# Patient Record
Sex: MALE | Race: WHITE | Employment: STUDENT | RURAL
[De-identification: names, ages, dates, MRNs, and addresses within clinical notes are randomized per-mention and may not be internally consistent; named-entity substitution may affect disease eponyms.]

---
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
## 2021-04-03 ENCOUNTER — HOSPITAL ENCOUNTER (EMERGENCY)
Age: 21
Discharge: HOME | End: 2021-04-03
Attending: EMERGENCY MEDICINE
Payer: MEDICAID

## 2021-04-03 VITALS
BODY MASS INDEX: 20.08 KG/M2 | HEIGHT: 68 IN | HEART RATE: 73 BPM | SYSTOLIC BLOOD PRESSURE: 132 MMHG | DIASTOLIC BLOOD PRESSURE: 60 MMHG | OXYGEN SATURATION: 100 % | RESPIRATION RATE: 20 BRPM | WEIGHT: 132.5 LBS | TEMPERATURE: 98 F

## 2021-04-03 DIAGNOSIS — L02.01 CUTANEOUS ABSCESS OF FACE: Primary | ICD-10-CM

## 2021-04-03 PROCEDURE — 99284 EMERGENCY DEPT VISIT MOD MDM: CPT

## 2021-04-03 PROCEDURE — 74011000250 HC RX REV CODE- 250: Performed by: EMERGENCY MEDICINE

## 2021-04-03 PROCEDURE — 75810000289 HC I&D ABSCESS SIMP/COMP/MULT

## 2021-04-03 PROCEDURE — 74011250637 HC RX REV CODE- 250/637: Performed by: EMERGENCY MEDICINE

## 2021-04-03 RX ORDER — OXYCODONE AND ACETAMINOPHEN 5; 325 MG/1; MG/1
1 TABLET ORAL ONCE
Status: COMPLETED | OUTPATIENT
Start: 2021-04-03 | End: 2021-04-03

## 2021-04-03 RX ORDER — LIDOCAINE HYDROCHLORIDE AND EPINEPHRINE 10; 10 MG/ML; UG/ML
10 INJECTION, SOLUTION INFILTRATION; PERINEURAL ONCE
Status: COMPLETED | OUTPATIENT
Start: 2021-04-03 | End: 2021-04-03

## 2021-04-03 RX ORDER — IBUPROFEN 800 MG/1
800 TABLET ORAL ONCE
Status: COMPLETED | OUTPATIENT
Start: 2021-04-03 | End: 2021-04-03

## 2021-04-03 RX ORDER — DOXYCYCLINE HYCLATE 100 MG
100 TABLET ORAL
Status: COMPLETED | OUTPATIENT
Start: 2021-04-03 | End: 2021-04-03

## 2021-04-03 RX ORDER — DOXYCYCLINE HYCLATE 100 MG
100 TABLET ORAL 2 TIMES DAILY
Qty: 20 TAB | Refills: 0 | Status: SHIPPED | OUTPATIENT
Start: 2021-04-03 | End: 2021-04-13

## 2021-04-03 RX ADMIN — DOXYCYCLINE HYCLATE 100 MG: 100 TABLET, COATED ORAL at 20:19

## 2021-04-03 RX ADMIN — LIDOCAINE HYDROCHLORIDE,EPINEPHRINE BITARTRATE 100 MG: 10; .01 INJECTION, SOLUTION INFILTRATION; PERINEURAL at 19:30

## 2021-04-03 RX ADMIN — IBUPROFEN 800 MG: 800 TABLET, FILM COATED ORAL at 19:30

## 2021-04-03 RX ADMIN — OXYCODONE HYDROCHLORIDE AND ACETAMINOPHEN 1 TABLET: 5; 325 TABLET ORAL at 20:19

## 2021-04-03 NOTE — ED TRIAGE NOTES
Pt presents with left lower jaw abscess for the past month. Worse over the last 2 weeks. Approx \"golf ball\" in size. Denies drainage.

## 2021-04-04 NOTE — ED PROVIDER NOTES
The history is provided by the patient. Skin Problem   This is a new problem. Episode onset: 4 weeks ago but would intermittently improve, began to gradually worsen one week ago. The problem has been gradually worsening. Patient reports a subjective fever - was not measured. The fever has been present for less than 1 day. The rash is present on the face. The pain is moderate. The pain has been constant since onset. Pertinent negatives include no weeping. Treatments tried: took two doses of an unknown antibiotic. The treatment provided no relief.         Past Medical History:   Diagnosis Date    ADHD     Anxiety     Bipolar 1 disorder ([facility] [address]     Dental disorder     Depression     anxiety and depression    Depression     Depression     Headache     History of pseudoseizure        Past Surgical History:   Procedure Laterality Date    HX HEENT      HX ORTHOPAEDIC      pellett removal from foot    HX TONSILLECTOMY  2017         Family History:   Problem Relation Age of Onset    Hypertension Mother     Alcohol abuse Father        Social History     Socioeconomic History    Marital status: SINGLE     Spouse name: Not on file    Number of children: Not on file    Years of education: Not on file    Highest education level: Not on file   Occupational History    Not on file   Social Needs    Financial resource strain: Not on file    Food insecurity     Worry: Not on file     Inability: Not on file    Transportation needs     Medical: Not on file     Non-medical: Not on file   Tobacco Use    Smoking status: Never Smoker    Smokeless tobacco: Current User   Substance and Sexual Activity    Alcohol use: No    Drug use: No    Sexual activity: Not Currently   Lifestyle    Physical activity     Days per week: Not on file     Minutes per session: Not on file    Stress: Not on file   Relationships    Social connections     Talks on phone: Not on file     Gets together: Not on file     Attends Mosque service: Not on file     Active member of club or organization: Not on file     Attends meetings of clubs or organizations: Not on file     Relationship status: Not on file    Intimate partner violence     Fear of current or ex partner: Not on file     Emotionally abused: Not on file     Physically abused: Not on file     Forced sexual activity: Not on file   Other Topics Concern    Not on file   Social History Narrative    ** Merged History Encounter **              ALLERGIES: Bee sting [sting, bee] and Medrol [methylprednisolone]    Review of Systems   Constitutional: Positive for chills and fever (subjective). Respiratory: Negative for shortness of breath. Cardiovascular: Negative for chest pain. Gastrointestinal: Negative for abdominal pain, constipation, diarrhea and vomiting. Neurological: Negative for dizziness and light-headedness. All other systems reviewed and are negative. Vitals:    04/03/21 1841 04/03/21 1850   BP: 126/77    Pulse: 100    Resp: 16 15   Temp: 98.1 °F (36.7 °C)    SpO2: 96% 100%   Weight: 60.1 kg (132 lb 7.9 oz)    Height: 5' 8\" (1.727 m)             Physical Exam  Vitals signs and nursing note reviewed. Constitutional:       Appearance: He is well-developed. HENT:      Head: Normocephalic and atraumatic. Mouth/Throat:      Mouth: Mucous membranes are moist.      Dentition: Normal dentition. No dental tenderness, gingival swelling or dental abscesses. Eyes:      General: No scleral icterus. Neck:      Musculoskeletal: Normal range of motion. Cardiovascular:      Rate and Rhythm: Normal rate. Pulmonary:      Effort: Pulmonary effort is normal.   Abdominal:      General: There is no distension. Skin:     General: Skin is warm and dry. Findings: Abscess (4 cm to left lower jaw) present. No erythema or rash. Neurological:      Mental Status: He is alert and oriented to person, place, and time.           [facility]       I&D Abcess Simple    Date/Time: 4/3/2021 8:29 PM  Performed by: Marcus Telles MD  Authorized by: Marcus Telles MD     Consent:     Consent obtained:  Verbal    Consent given by:  Patient    Risks discussed:  Bleeding, incomplete drainage, pain and infection    Alternatives discussed:  No treatment and alternative treatment  Location:     Type:  Abscess    Size:  4 cm    Location:  Head    Head location:  Face  Pre-procedure details:     Skin preparation:  Chloraprep  Anesthesia (see MAR for exact dosages): Anesthesia method:  Local infiltration    Local anesthetic:  Lidocaine 1% WITH epi  Procedure type:     Complexity:  Simple  Procedure details:     Needle aspiration: no      Incision types:  Single straight    Incision depth:  Subcutaneous    Scalpel blade:  15    Wound management:  Probed and deloculated and irrigated with saline    Drainage:  Purulent    Drainage amount:  Copious    Wound treatment:  Wound left open    Packing materials:  None  Post-procedure details:     Patient tolerance of procedure: Tolerated well, no immediate complications              The patient is now resting comfortably and feels better, is alert, is not toxic, and is in no distress. The patient has a normal mental status and is neurologically intact. The rash does not have petechiae or purpura. There are no mucous membrane lesions, no signs of dental abscess, and no bullae. The patient appears well, has no fever, altered mental status or signs of systemic toxicity. The history, exam, diagnostic testing (if any) and current condition do not demonstrate signs of sepsis, meningitis, toxic shock syndrome or other significant systemic illness requiring further treatment, testing or consultation in the emergency department. The vital signs have been stable. The patient's condition is stable and appropriate for discharge.  The patient will pursue further outpatient evaluation with the primary care physician or other designated or consulting physician as indicated in the discharge instructions.

## 2021-04-04 NOTE — ED NOTES
Incision and drainage to left jaw abscess done by Dr. Randa Hollins Bulky dressing applied and patient is discharged with prescription and instructions for follow up. Patient discharged home with mother.  . No signs of distress at this time

## 2022-03-19 PROBLEM — Z87.898 HISTORY OF PSEUDOSEIZURE: Status: ACTIVE | Noted: 2017-02-02

## 2022-04-16 ENCOUNTER — HOSPITAL ENCOUNTER (EMERGENCY)
Dept: GENERAL RADIOLOGY | Age: 22
Discharge: HOME | End: 2022-04-16
Attending: STUDENT IN AN ORGANIZED HEALTH CARE EDUCATION/TRAINING PROGRAM
Payer: MEDICAID

## 2022-04-16 ENCOUNTER — HOSPITAL ENCOUNTER (EMERGENCY)
Age: 22
Discharge: HOME | End: 2022-04-16
Attending: STUDENT IN AN ORGANIZED HEALTH CARE EDUCATION/TRAINING PROGRAM
Payer: MEDICAID

## 2022-04-16 VITALS
TEMPERATURE: 98.2 F | HEIGHT: 68 IN | OXYGEN SATURATION: 96 % | WEIGHT: 155 LBS | RESPIRATION RATE: 16 BRPM | BODY MASS INDEX: 23.49 KG/M2 | HEART RATE: 58 BPM | SYSTOLIC BLOOD PRESSURE: 122 MMHG | DIASTOLIC BLOOD PRESSURE: 73 MMHG

## 2022-04-16 DIAGNOSIS — T07.XXXA ABRASIONS OF MULTIPLE SITES: Primary | ICD-10-CM

## 2022-04-16 DIAGNOSIS — R20.2 TINGLING OF RIGHT UPPER EXTREMITY: ICD-10-CM

## 2022-04-16 PROCEDURE — 74011250636 HC RX REV CODE- 250/636: Performed by: STUDENT IN AN ORGANIZED HEALTH CARE EDUCATION/TRAINING PROGRAM

## 2022-04-16 PROCEDURE — 90715 TDAP VACCINE 7 YRS/> IM: CPT | Performed by: STUDENT IN AN ORGANIZED HEALTH CARE EDUCATION/TRAINING PROGRAM

## 2022-04-16 PROCEDURE — 73030 X-RAY EXAM OF SHOULDER: CPT

## 2022-04-16 PROCEDURE — 74011000250 HC RX REV CODE- 250: Performed by: STUDENT IN AN ORGANIZED HEALTH CARE EDUCATION/TRAINING PROGRAM

## 2022-04-16 PROCEDURE — 99284 EMERGENCY DEPT VISIT MOD MDM: CPT

## 2022-04-16 PROCEDURE — 90471 IMMUNIZATION ADMIN: CPT

## 2022-04-16 PROCEDURE — 73080 X-RAY EXAM OF ELBOW: CPT

## 2022-04-16 RX ORDER — BACITRACIN 500 UNIT/G
1 PACKET (EA) TOPICAL 3 TIMES DAILY
Status: DISCONTINUED | OUTPATIENT
Start: 2022-04-16 | End: 2022-04-17 | Stop reason: HOSPADM

## 2022-04-16 RX ADMIN — TETANUS TOXOID, REDUCED DIPHTHERIA TOXOID AND ACELLULAR PERTUSSIS VACCINE, ADSORBED 0.5 ML: 5; 2.5; 8; 8; 2.5 SUSPENSION INTRAMUSCULAR at 21:50

## 2022-04-16 RX ADMIN — BACITRACIN 1 PACKET: 500 OINTMENT TOPICAL at 21:50
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
## 2022-04-17 NOTE — DISCHARGE INSTRUCTIONS
The tingling you are experiencing is likely related to stretching of the nerves in the brachial plexus from sudden arm movement, this will likely improve over the coming weeks. Please follow-up with your primary care, if you have any further concern regarding the issue of tingling or if you need new neurological complaints you can return to the ED or follow up with neurology for further testing such as a nerve conduction study.

## 2022-04-17 NOTE — ED NOTES
Discharge instructions given to pt by RN. Pt educated on wound care and f/u in teach back method and verbalizes understanding. Opportunity for questions provided. Pt ambulatory out of unit, in no acute distress and taken home by friend.
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
## 2022-04-17 NOTE — ED NOTES
Wound irrigated with sterile water and cleansed with wound cleanser. Pt tolerated well. Nonstick bandage applied to wound and wrapped with elastic gauze. Ice pack provided for RUE    Friend at the bedside. VSS. No s/s of acute distress noted.

## 2023-07-08 ENCOUNTER — HOSPITAL ENCOUNTER (EMERGENCY)
Facility: HOSPITAL | Age: 23
Discharge: PSYCHIATRIC OR SUBSTANCE USE FACILITY | End: 2023-07-09
Attending: EMERGENCY MEDICINE
Payer: MEDICAID

## 2023-07-08 DIAGNOSIS — F29 PSYCHOSIS, UNSPECIFIED PSYCHOSIS TYPE (HCC): ICD-10-CM

## 2023-07-08 DIAGNOSIS — F22 PARANOIA (HCC): Primary | ICD-10-CM

## 2023-07-08 DIAGNOSIS — E87.6 HYPOKALEMIA: ICD-10-CM

## 2023-07-08 LAB
ALBUMIN SERPL-MCNC: 4.4 G/DL (ref 3.5–5)
ALBUMIN/GLOB SERPL: 1.3 (ref 1.1–2.2)
ALP SERPL-CCNC: 52 U/L (ref 45–117)
ALT SERPL-CCNC: 21 U/L (ref 12–78)
AMPHET UR QL SCN: NEGATIVE
ANION GAP SERPL CALC-SCNC: 4 MMOL/L (ref 5–15)
APAP SERPL-MCNC: <2 UG/ML (ref 10–30)
APPEARANCE UR: CLEAR
AST SERPL-CCNC: 12 U/L (ref 15–37)
BACTERIA URNS QL MICRO: NEGATIVE /HPF
BARBITURATES UR QL SCN: NEGATIVE
BASOPHILS # BLD: 0 K/UL (ref 0–0.1)
BASOPHILS NFR BLD: 0 % (ref 0–1)
BENZODIAZ UR QL: NEGATIVE
BILIRUB SERPL-MCNC: 0.5 MG/DL (ref 0.2–1)
BILIRUB UR QL: NEGATIVE
BUN SERPL-MCNC: 11 MG/DL (ref 6–20)
BUN/CREAT SERPL: 11 (ref 12–20)
CALCIUM SERPL-MCNC: 9 MG/DL (ref 8.5–10.1)
CANNABINOIDS UR QL SCN: POSITIVE
CHLORIDE SERPL-SCNC: 106 MMOL/L (ref 97–108)
CO2 SERPL-SCNC: 28 MMOL/L (ref 21–32)
COCAINE UR QL SCN: NEGATIVE
COLOR UR: ABNORMAL
CREAT SERPL-MCNC: 1.03 MG/DL (ref 0.7–1.3)
DIFFERENTIAL METHOD BLD: ABNORMAL
EOSINOPHIL # BLD: 0.1 K/UL (ref 0–0.4)
EOSINOPHIL NFR BLD: 1 % (ref 0–7)
EPITH CASTS URNS QL MICRO: ABNORMAL /LPF
ERYTHROCYTE [DISTWIDTH] IN BLOOD BY AUTOMATED COUNT: 12.4 % (ref 11.5–14.5)
ETHANOL SERPL-MCNC: <10 MG/DL (ref 0–0.08)
GLOBULIN SER CALC-MCNC: 3.4 G/DL (ref 2–4)
GLUCOSE SERPL-MCNC: 108 MG/DL (ref 65–100)
GLUCOSE UR STRIP.AUTO-MCNC: NEGATIVE MG/DL
HCT VFR BLD AUTO: 40.4 % (ref 36.6–50.3)
HGB BLD-MCNC: 13.3 G/DL (ref 12.1–17)
HGB UR QL STRIP: NEGATIVE
HYALINE CASTS URNS QL MICRO: ABNORMAL /LPF (ref 0–5)
IMM GRANULOCYTES # BLD AUTO: 0 K/UL (ref 0–0.04)
IMM GRANULOCYTES NFR BLD AUTO: 0 % (ref 0–0.5)
KETONES UR QL STRIP.AUTO: ABNORMAL MG/DL
LEUKOCYTE ESTERASE UR QL STRIP.AUTO: NEGATIVE
LIPASE SERPL-CCNC: 62 U/L (ref 73–393)
LYMPHOCYTES # BLD: 1.8 K/UL (ref 0.8–3.5)
LYMPHOCYTES NFR BLD: 24 % (ref 12–49)
Lab: ABNORMAL
MAGNESIUM SERPL-MCNC: 2.2 MG/DL (ref 1.6–2.4)
MCH RBC QN AUTO: 29.6 PG (ref 26–34)
MCHC RBC AUTO-ENTMCNC: 32.9 G/DL (ref 30–36.5)
MCV RBC AUTO: 89.8 FL (ref 80–99)
METHADONE UR QL: NEGATIVE
MONOCYTES # BLD: 0.5 K/UL (ref 0–1)
MONOCYTES NFR BLD: 6 % (ref 5–13)
NEUTS SEG # BLD: 5.1 K/UL (ref 1.8–8)
NEUTS SEG NFR BLD: 69 % (ref 32–75)
NITRITE UR QL STRIP.AUTO: NEGATIVE
NRBC # BLD: 0 K/UL (ref 0–0.01)
NRBC BLD-RTO: 0 PER 100 WBC
OPIATES UR QL: NEGATIVE
PCP UR QL: NEGATIVE
PH UR STRIP: 6 (ref 5–8)
PLATELET # BLD AUTO: 114 K/UL (ref 150–400)
POTASSIUM SERPL-SCNC: 3 MMOL/L (ref 3.5–5.1)
PROT SERPL-MCNC: 7.8 G/DL (ref 6.4–8.2)
PROT UR STRIP-MCNC: NEGATIVE MG/DL
RBC # BLD AUTO: 4.5 M/UL (ref 4.1–5.7)
RBC #/AREA URNS HPF: ABNORMAL /HPF (ref 0–5)
SALICYLATES SERPL-MCNC: 2.5 MG/DL (ref 2.8–20)
SODIUM SERPL-SCNC: 138 MMOL/L (ref 136–145)
SP GR UR REFRACTOMETRY: 1.02 (ref 1–1.03)
SPECIMEN HOLD: NORMAL
UROBILINOGEN UR QL STRIP.AUTO: 1 EU/DL (ref 0.2–1)
WBC # BLD AUTO: 7.5 K/UL (ref 4.1–11.1)
WBC URNS QL MICRO: ABNORMAL /HPF (ref 0–4)

## 2023-07-08 PROCEDURE — 80307 DRUG TEST PRSMV CHEM ANLYZR: CPT

## 2023-07-08 PROCEDURE — 90791 PSYCH DIAGNOSTIC EVALUATION: CPT

## 2023-07-08 PROCEDURE — 80143 DRUG ASSAY ACETAMINOPHEN: CPT

## 2023-07-08 PROCEDURE — 85025 COMPLETE CBC W/AUTO DIFF WBC: CPT

## 2023-07-08 PROCEDURE — 81001 URINALYSIS AUTO W/SCOPE: CPT

## 2023-07-08 PROCEDURE — 36415 COLL VENOUS BLD VENIPUNCTURE: CPT

## 2023-07-08 PROCEDURE — 99285 EMERGENCY DEPT VISIT HI MDM: CPT

## 2023-07-08 PROCEDURE — 6370000000 HC RX 637 (ALT 250 FOR IP): Performed by: NURSE PRACTITIONER

## 2023-07-08 PROCEDURE — 83735 ASSAY OF MAGNESIUM: CPT

## 2023-07-08 PROCEDURE — 80053 COMPREHEN METABOLIC PANEL: CPT

## 2023-07-08 PROCEDURE — 80179 DRUG ASSAY SALICYLATE: CPT

## 2023-07-08 PROCEDURE — 82077 ASSAY SPEC XCP UR&BREATH IA: CPT

## 2023-07-08 PROCEDURE — 83690 ASSAY OF LIPASE: CPT

## 2023-07-08 PROCEDURE — 93005 ELECTROCARDIOGRAM TRACING: CPT | Performed by: NURSE PRACTITIONER

## 2023-07-08 RX ORDER — POTASSIUM CHLORIDE 750 MG/1
40 TABLET, FILM COATED, EXTENDED RELEASE ORAL ONCE
Status: COMPLETED | OUTPATIENT
Start: 2023-07-08 | End: 2023-07-08

## 2023-07-08 RX ADMIN — POTASSIUM CHLORIDE 40 MEQ: 750 TABLET, FILM COATED, EXTENDED RELEASE ORAL at 23:01

## 2023-07-08 ASSESSMENT — ENCOUNTER SYMPTOMS
COLOR CHANGE: 0
NAUSEA: 0
SINUS PRESSURE: 0
EYE PAIN: 0
ABDOMINAL DISTENTION: 0
SINUS PAIN: 0
ABDOMINAL PAIN: 0
SHORTNESS OF BREATH: 0
TROUBLE SWALLOWING: 0
COUGH: 0
VOMITING: 0
EYE REDNESS: 0

## 2023-07-08 ASSESSMENT — LIFESTYLE VARIABLES
HOW OFTEN DO YOU HAVE A DRINK CONTAINING ALCOHOL: 4 OR MORE TIMES A WEEK
HOW MANY STANDARD DRINKS CONTAINING ALCOHOL DO YOU HAVE ON A TYPICAL DAY: 3 OR 4

## 2023-07-08 ASSESSMENT — PAIN - FUNCTIONAL ASSESSMENT: PAIN_FUNCTIONAL_ASSESSMENT: NONE - DENIES PAIN

## 2023-07-09 ENCOUNTER — HOSPITAL ENCOUNTER (INPATIENT)
Facility: HOSPITAL | Age: 23
LOS: 5 days | Discharge: OTHER INSTITUTION | DRG: 753 | End: 2023-07-14
Attending: PSYCHIATRY & NEUROLOGY | Admitting: PSYCHIATRY & NEUROLOGY
Payer: MEDICAID

## 2023-07-09 VITALS
BODY MASS INDEX: 25.18 KG/M2 | DIASTOLIC BLOOD PRESSURE: 67 MMHG | TEMPERATURE: 98.3 F | WEIGHT: 170 LBS | HEIGHT: 69 IN | OXYGEN SATURATION: 100 % | SYSTOLIC BLOOD PRESSURE: 155 MMHG | HEART RATE: 62 BPM | RESPIRATION RATE: 18 BRPM

## 2023-07-09 LAB
EKG ATRIAL RATE: 73 BPM
EKG ATRIAL RATE: 87 BPM
EKG DIAGNOSIS: NORMAL
EKG DIAGNOSIS: NORMAL
EKG P AXIS: 75 DEGREES
EKG P AXIS: 80 DEGREES
EKG P-R INTERVAL: 138 MS
EKG P-R INTERVAL: 146 MS
EKG Q-T INTERVAL: 344 MS
EKG Q-T INTERVAL: 360 MS
EKG QRS DURATION: 92 MS
EKG QRS DURATION: 92 MS
EKG QTC CALCULATION (BAZETT): 396 MS
EKG QTC CALCULATION (BAZETT): 413 MS
EKG R AXIS: 37 DEGREES
EKG R AXIS: 45 DEGREES
EKG T AXIS: 71 DEGREES
EKG T AXIS: 80 DEGREES
EKG VENTRICULAR RATE: 73 BPM
EKG VENTRICULAR RATE: 87 BPM
SARS-COV-2 RNA RESP QL NAA+PROBE: NOT DETECTED
SOURCE: NORMAL

## 2023-07-09 PROCEDURE — 6370000000 HC RX 637 (ALT 250 FOR IP): Performed by: NURSE PRACTITIONER

## 2023-07-09 PROCEDURE — 93010 ELECTROCARDIOGRAM REPORT: CPT | Performed by: INTERNAL MEDICINE

## 2023-07-09 PROCEDURE — 6370000000 HC RX 637 (ALT 250 FOR IP): Performed by: EMERGENCY MEDICINE

## 2023-07-09 PROCEDURE — 87635 SARS-COV-2 COVID-19 AMP PRB: CPT

## 2023-07-09 PROCEDURE — 1240000000 HC EMOTIONAL WELLNESS R&B

## 2023-07-09 RX ORDER — RISPERIDONE 1 MG/1
1 TABLET ORAL DAILY
Status: DISCONTINUED | OUTPATIENT
Start: 2023-07-09 | End: 2023-07-10

## 2023-07-09 RX ORDER — DIPHENHYDRAMINE HYDROCHLORIDE 50 MG/ML
50 INJECTION INTRAMUSCULAR; INTRAVENOUS EVERY 4 HOURS PRN
Status: DISCONTINUED | OUTPATIENT
Start: 2023-07-09 | End: 2023-07-14 | Stop reason: HOSPADM

## 2023-07-09 RX ORDER — ACETAMINOPHEN 325 MG/1
650 TABLET ORAL EVERY 4 HOURS PRN
Status: DISCONTINUED | OUTPATIENT
Start: 2023-07-09 | End: 2023-07-14 | Stop reason: HOSPADM

## 2023-07-09 RX ORDER — NICOTINE 21 MG/24HR
1 PATCH, TRANSDERMAL 24 HOURS TRANSDERMAL DAILY
Status: DISCONTINUED | OUTPATIENT
Start: 2023-07-09 | End: 2023-07-09 | Stop reason: HOSPADM

## 2023-07-09 RX ORDER — MAGNESIUM HYDROXIDE/ALUMINUM HYDROXICE/SIMETHICONE 120; 1200; 1200 MG/30ML; MG/30ML; MG/30ML
30 SUSPENSION ORAL EVERY 6 HOURS PRN
Status: DISCONTINUED | OUTPATIENT
Start: 2023-07-09 | End: 2023-07-14 | Stop reason: HOSPADM

## 2023-07-09 RX ORDER — TRAZODONE HYDROCHLORIDE 50 MG/1
50 TABLET ORAL NIGHTLY PRN
Status: DISCONTINUED | OUTPATIENT
Start: 2023-07-09 | End: 2023-07-14 | Stop reason: HOSPADM

## 2023-07-09 RX ORDER — HALOPERIDOL 5 MG/ML
5 INJECTION INTRAMUSCULAR EVERY 4 HOURS PRN
Status: DISCONTINUED | OUTPATIENT
Start: 2023-07-09 | End: 2023-07-14 | Stop reason: HOSPADM

## 2023-07-09 RX ORDER — POLYETHYLENE GLYCOL 3350 17 G/17G
17 POWDER, FOR SOLUTION ORAL DAILY PRN
Status: DISCONTINUED | OUTPATIENT
Start: 2023-07-09 | End: 2023-07-14 | Stop reason: HOSPADM

## 2023-07-09 RX ORDER — HYDROXYZINE HYDROCHLORIDE 25 MG/1
50 TABLET, FILM COATED ORAL 3 TIMES DAILY PRN
Status: DISCONTINUED | OUTPATIENT
Start: 2023-07-09 | End: 2023-07-14 | Stop reason: HOSPADM

## 2023-07-09 RX ORDER — HALOPERIDOL 5 MG/1
5 TABLET ORAL EVERY 4 HOURS PRN
Status: DISCONTINUED | OUTPATIENT
Start: 2023-07-09 | End: 2023-07-14 | Stop reason: HOSPADM

## 2023-07-09 RX ADMIN — HYDROXYZINE HYDROCHLORIDE 50 MG: 25 TABLET, FILM COATED ORAL at 21:55

## 2023-07-09 RX ADMIN — RISPERIDONE 1 MG: 1 TABLET ORAL at 13:28

## 2023-07-09 RX ADMIN — TRAZODONE HYDROCHLORIDE 50 MG: 50 TABLET ORAL at 21:55

## 2023-07-09 ASSESSMENT — PAIN - FUNCTIONAL ASSESSMENT: PAIN_FUNCTIONAL_ASSESSMENT: NONE - DENIES PAIN

## 2023-07-09 ASSESSMENT — LIFESTYLE VARIABLES
HOW OFTEN DO YOU HAVE A DRINK CONTAINING ALCOHOL: 2-3 TIMES A WEEK
HOW MANY STANDARD DRINKS CONTAINING ALCOHOL DO YOU HAVE ON A TYPICAL DAY: 1 OR 2

## 2023-07-09 ASSESSMENT — SLEEP AND FATIGUE QUESTIONNAIRES
SLEEP PATTERN: INSOMNIA
AVERAGE NUMBER OF SLEEP HOURS: 6
DO YOU USE A SLEEP AID: NO
DO YOU HAVE DIFFICULTY SLEEPING: YES

## 2023-07-10 PROCEDURE — 6370000000 HC RX 637 (ALT 250 FOR IP): Performed by: NURSE PRACTITIONER

## 2023-07-10 PROCEDURE — 6370000000 HC RX 637 (ALT 250 FOR IP): Performed by: PSYCHIATRY & NEUROLOGY

## 2023-07-10 PROCEDURE — 1240000000 HC EMOTIONAL WELLNESS R&B

## 2023-07-10 RX ORDER — NICOTINE 21 MG/24HR
1 PATCH, TRANSDERMAL 24 HOURS TRANSDERMAL DAILY
Status: DISCONTINUED | OUTPATIENT
Start: 2023-07-10 | End: 2023-07-14 | Stop reason: HOSPADM

## 2023-07-10 RX ORDER — RISPERIDONE 1 MG/1
1 TABLET ORAL 2 TIMES DAILY
Status: DISCONTINUED | OUTPATIENT
Start: 2023-07-10 | End: 2023-07-14 | Stop reason: HOSPADM

## 2023-07-10 RX ADMIN — TRAZODONE HYDROCHLORIDE 50 MG: 50 TABLET ORAL at 20:42

## 2023-07-10 RX ADMIN — HYDROXYZINE HYDROCHLORIDE 50 MG: 25 TABLET, FILM COATED ORAL at 20:42

## 2023-07-10 RX ADMIN — RISPERIDONE 1 MG: 1 TABLET ORAL at 08:34

## 2023-07-10 RX ADMIN — RISPERIDONE 1 MG: 1 TABLET, FILM COATED ORAL at 20:42

## 2023-07-10 NOTE — PLAN OF CARE
This writer met with pt in his bedroom. Affect flat, eye contact fair, mood depressed. Denies Si/HI/AVH. Reports 4/10 depression with thoughts of punching a wall, pt stated he could journal to distract himself. No unsafe bx noted or reported. Compliant with scheduled medication. Calm and cooperative, withdrawn to self and isolative in bedroom. Will continue to monitor. Problem: Behavior  Goal: Pt/Family maintain appropriate behavior and adhere to behavioral management agreement, if implemented  Description: INTERVENTIONS:  1. Assess patient/family's coping skills and  non-compliant behavior (including use of illegal substances)  2. Notify security of behavior or suspected illegal substances which indicate the need for search of the family and/or belongings  3. Encourage verbalization of thoughts and concerns in a socially appropriate manner  4. Utilize positive, consistent limit setting strategies supporting safety of patient, staff and others  5. Encourage participation in the decision making process about the behavioral management agreement  6. If a visitor's behavior poses a threat to safety call refer to organization policy.   7. Initiate consult with , Psychosocial CNS, Spiritual Care as appropriate  Outcome: Progressing

## 2023-07-10 NOTE — CARE COORDINATION
07/10/23 1541   ITP   Date of Plan 07/10/23   Date of Next Review 07/12/23   Primary Diagnosis Code Hx of Bipolar disorder   Barriers to Treatment Client resistance; Need for psychoeducation   Strengths Incorporated in Plan Acknowledging need for assistance   Plan of Care   Long Term Goal (LTG) Stated in patient/guardian terms To maintain stability in the community   Short Term Goal 1   Short Term Goal 1 Client will learn and demonstrate effective coping skills   Baseline Functioning Client experiences SI   Target Client will be able to manage negative feelings so as to not have thoughts of harming self. Objectives Other (comment)  (Client will form coping skills)   Intervention 1 Group therapy   Frequency Daily   Measured by Self report;Staff observation   Staff Responsible Clinical staff;[facility] staff   STG Goal 1 Status: Patient Appears to be  Progressing toward treatment plan goal   Short Term Goal 2   Short Term Goal 2 Client will maintain compliance with medication regime   Baseline Functioning Client is not taking medications in the community   Target Client will comply with medication regimen that suits his individual needs   Objectives Other (comment)  (Client will take medications while in the hospital)   Intervention 1 Monitor medications   Frequency Daily   Measured by Staff observation;Self report; Other (comment)  (Chart review)   Staff Responsible Clinical staff;[facility] staff   STG Goal 2 Status: Patient Appears to be  Progressing toward treatment plan goal   Crisis/Safety/Discharge Plan   Crisis/Safety Plan Standard program interventions and protocol   Comprehensive Assessment Completion Date 07/10/23   Discharge Plan CSU     ERNST Redman

## 2023-07-10 NOTE — H&P
Full H&P dictated. Dx: Bipolar 1 disorder, mixed, with psychotic features. Rule out compulsive sexual behavior.

## 2023-07-10 NOTE — GROUP NOTE
Group Therapy Note    Date: 7/10/2023    Group Start Time: 1000  Group End Time: 1100  Group Topic: Topic Group    [place] [address] [facility] [address] [place] [address]        Group Therapy Note    Attendees:        Patient's Goal:  To participate in relaxation activity    Notes:  Pt  did not attend session      Discipline Responsible: Recreational Therapist      Signature:  Radha Jimenez
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
## 2023-07-11 LAB
CHOLEST SERPL-MCNC: 170 MG/DL
EST. AVERAGE GLUCOSE BLD GHB EST-MCNC: 88 MG/DL
HBA1C MFR BLD: 4.7 % (ref 4–5.6)
HDLC SERPL-MCNC: 58 MG/DL
HDLC SERPL: 2.9 (ref 0–5)
LDLC SERPL CALC-MCNC: 101.6 MG/DL (ref 0–100)
TRIGL SERPL-MCNC: 52 MG/DL
TSH SERPL DL<=0.05 MIU/L-ACNC: 1.16 UIU/ML (ref 0.36–3.74)
VLDLC SERPL CALC-MCNC: 10.4 MG/DL

## 2023-07-11 PROCEDURE — 1240000000 HC EMOTIONAL WELLNESS R&B

## 2023-07-11 PROCEDURE — 6370000000 HC RX 637 (ALT 250 FOR IP): Performed by: PSYCHIATRY & NEUROLOGY

## 2023-07-11 PROCEDURE — 83036 HEMOGLOBIN GLYCOSYLATED A1C: CPT

## 2023-07-11 PROCEDURE — 6370000000 HC RX 637 (ALT 250 FOR IP): Performed by: NURSE PRACTITIONER

## 2023-07-11 PROCEDURE — 36415 COLL VENOUS BLD VENIPUNCTURE: CPT

## 2023-07-11 PROCEDURE — 84443 ASSAY THYROID STIM HORMONE: CPT

## 2023-07-11 PROCEDURE — 80061 LIPID PANEL: CPT

## 2023-07-11 RX ADMIN — POLYETHYLENE GLYCOL 3350 17 G: 17 POWDER, FOR SOLUTION ORAL at 13:03

## 2023-07-11 RX ADMIN — RISPERIDONE 1 MG: 1 TABLET, FILM COATED ORAL at 21:26

## 2023-07-11 RX ADMIN — HYDROXYZINE HYDROCHLORIDE 50 MG: 25 TABLET, FILM COATED ORAL at 21:26

## 2023-07-11 RX ADMIN — TRAZODONE HYDROCHLORIDE 50 MG: 50 TABLET ORAL at 21:25

## 2023-07-11 RX ADMIN — RISPERIDONE 1 MG: 1 TABLET, FILM COATED ORAL at 08:49

## 2023-07-11 ASSESSMENT — PAIN DESCRIPTION - DESCRIPTORS: DESCRIPTORS: NUMBNESS

## 2023-07-11 ASSESSMENT — PAIN DESCRIPTION - FREQUENCY: FREQUENCY: INTERMITTENT

## 2023-07-11 ASSESSMENT — PAIN DESCRIPTION - PAIN TYPE: TYPE: CHRONIC PAIN

## 2023-07-11 ASSESSMENT — PAIN DESCRIPTION - LOCATION: LOCATION: CHEST

## 2023-07-11 ASSESSMENT — PAIN SCALES - GENERAL: PAINLEVEL_OUTOF10: 3

## 2023-07-11 ASSESSMENT — PAIN - FUNCTIONAL ASSESSMENT: PAIN_FUNCTIONAL_ASSESSMENT: ACTIVITIES ARE NOT PREVENTED

## 2023-07-11 NOTE — GROUP NOTE
Group Therapy Note    Date: 7/11/2023    Group Start Time: 1400  Group End Time: 1500  Group Topic: Recreational    [facility] [place] [address] [facility]    [address]        Group Therapy Note    Attendees: 7       Patient's Goal:  To concentrate on selected task     Status After Intervention:  Improved    Participation Level: Interactive    Participation Quality: Attentive and Sharing      Speech:  normal      Affective Functioning: Congruent      Mood: euthymic      Level of consciousness:  Attentive      Response to Learning: Progressing to goal      Endings: None Reported    Modes of Intervention: Socialization and Activity      Discipline Responsible: Recreational Therapist      Signature:  Kandi Loving

## 2023-07-11 NOTE — PLAN OF CARE
Problem: Anxiety  Goal: Will report anxiety at manageable levels  Description: INTERVENTIONS:  1. Administer medication as ordered  2. Teach and rehearse alternative coping skills  3. Provide emotional support with 1:1 interaction with staff  Outcome: Progressing    Assumed care of patient. Met patient in dayroom. Calm and cooperative with assessment. Patient presented with a flat affect. Appears depressed. Endorsed anxiety from having nightmares. Patient also endorsed depression 3/10. Denied SI, HI and AVH. Patient reported chest pain occurring approximately six months ago. Patient states that he has had scans done in the past and doctors were unable to find anything. PRNs offered, patient refused. Patient visible on unit. In dayroom for groups. Interacted appropriately with staff and peers. No issues noted throughout shift.

## 2023-07-12 PROCEDURE — 6370000000 HC RX 637 (ALT 250 FOR IP): Performed by: NURSE PRACTITIONER

## 2023-07-12 PROCEDURE — 6370000000 HC RX 637 (ALT 250 FOR IP): Performed by: PSYCHIATRY & NEUROLOGY

## 2023-07-12 PROCEDURE — 1240000000 HC EMOTIONAL WELLNESS R&B

## 2023-07-12 RX ADMIN — TRAZODONE HYDROCHLORIDE 50 MG: 50 TABLET ORAL at 21:53

## 2023-07-12 RX ADMIN — RISPERIDONE 1 MG: 1 TABLET, FILM COATED ORAL at 21:53

## 2023-07-12 RX ADMIN — RISPERIDONE 1 MG: 1 TABLET, FILM COATED ORAL at 08:33

## 2023-07-12 RX ADMIN — HYDROXYZINE HYDROCHLORIDE 50 MG: 25 TABLET, FILM COATED ORAL at 09:29

## 2023-07-12 RX ADMIN — HYDROXYZINE HYDROCHLORIDE 50 MG: 25 TABLET, FILM COATED ORAL at 21:53

## 2023-07-12 NOTE — GROUP NOTE
Group Therapy Note    Date: 7/12/2023    Group Start Time: 1500  Group End Time: 1600  Group Topic: Recreational    [address] [facility]    [address]        Group Therapy Note    Attendees:        Patient's Goal:  To concentrate on selected task       Status After Intervention:  Improved    Participation Level: Interactive    Participation Quality: Attentive and Sharing      Speech:  normal      Thought Process/Content: Logical      Affective Functioning: Congruent      Mood: euthymic      Level of consciousness:  Attentive      Response to Learning: Progressing to goal      Endings: None Reported    Modes of Intervention: Socialization and Activity      Discipline Responsible: Recreational Therapist      Signature:  Blair Manuel

## 2023-07-12 NOTE — GROUP NOTE
Group Therapy Note    Date: 7/12/2023    Group Start Time: 1000  Group End Time: 1100  Group Topic: Topic Group    [facility]    [address]        Group Therapy Note    Attendees:        Patient's Goal:  To identify positive coping strategies a-z     Status After Intervention:  Improved    Participation Level: Interactive    Participation Quality: Attentive      Speech:  normal      Thought Process/Content: Logical      Affective Functioning: Congruent      Mood: euthymic      Level of consciousness:  Attentive      Response to Learning: Progressing to goal      Endings: None Reported    Modes of Intervention: Education      Discipline Responsible: Recreational Therapist      Signature:  Brock Tapia

## 2023-07-12 NOTE — PROGRESS NOTES
Patient actively participated in Spirituality Group about etelvina in the [address] [place] [address].  utilized music, lyrics to favorite songs, words of encouragement and affirmation, scripture, and testimony to facilitate the group discussion and enhance group dynamics. Rev.  George Cleary, [address], [address] [facility]

## 2023-07-12 NOTE — PLAN OF CARE
Problem: Anxiety  Goal: Will report anxiety at manageable levels  Description: INTERVENTIONS:  1. Administer medication as ordered  2. Teach and rehearse alternative coping skills  3. Provide emotional support with 1:1 interaction with staff  Outcome: Progressing    Assumed care of patient. Met patient in dayroom. Calm and cooperative with assessment. Patient presented with a euthymic mood. Patient endorsed anxiety and depression 2/10. Denied SI, HI and AVH. No complaints of pain. Medication and meal compliant. PRN Atarax given at 0929. Patient visible on unit. In dayroom for groups. Interacting appropriately with staff and peers. No issues noted throughout shift.

## 2023-07-12 NOTE — CARE COORDINATION
07/12/23 1205   ITP   Date of Next Review 07/19/23   Short Term Goal 1   STG Goal 1 Status: Patient Appears to be  Progressing toward treatment plan goal   Short Term Goal 2   STG Goal 2 Status: Patient Appears to be  Progressing toward treatment plan goal     ERNST Pelaez

## 2023-07-12 NOTE — PROGRESS NOTES
GROUP THERAPY PROGRESS NOTE      Kelly Mathew was not present for medication group.       Moisés Retana, PharmD, BCPS, [address] [place] [address]  Desk: [phone] ([id number])  Pharmacy: [phone] ([phone])

## 2023-07-13 LAB
-: NORMAL
HAV IGM SER QL: NONREACTIVE
HBV CORE IGM SER QL: NONREACTIVE
HBV SURFACE AG SER QL: <0.1 INDEX
HBV SURFACE AG SER QL: NEGATIVE
HCV AB SERPL QL IA: NONREACTIVE
RPR SER QL: NONREACTIVE

## 2023-07-13 PROCEDURE — 80074 ACUTE HEPATITIS PANEL: CPT

## 2023-07-13 PROCEDURE — 87536 HIV-1 QUANT&REVRSE TRNSCRPJ: CPT

## 2023-07-13 PROCEDURE — 6370000000 HC RX 637 (ALT 250 FOR IP): Performed by: PSYCHIATRY & NEUROLOGY

## 2023-07-13 PROCEDURE — 86592 SYPHILIS TEST NON-TREP QUAL: CPT

## 2023-07-13 PROCEDURE — 1240000000 HC EMOTIONAL WELLNESS R&B

## 2023-07-13 PROCEDURE — 36415 COLL VENOUS BLD VENIPUNCTURE: CPT

## 2023-07-13 PROCEDURE — 6370000000 HC RX 637 (ALT 250 FOR IP): Performed by: NURSE PRACTITIONER

## 2023-07-13 RX ORDER — HYDROXYZINE 50 MG/1
50 TABLET, FILM COATED ORAL 3 TIMES DAILY PRN
Qty: 30 TABLET | Refills: 0 | Status: SHIPPED | OUTPATIENT
Start: 2023-07-13

## 2023-07-13 RX ORDER — TRAZODONE HYDROCHLORIDE 50 MG/1
50 TABLET ORAL NIGHTLY PRN
Qty: 30 TABLET | Refills: 0 | Status: SHIPPED | OUTPATIENT
Start: 2023-07-13

## 2023-07-13 RX ORDER — RISPERIDONE 1 MG/1
1 TABLET ORAL 2 TIMES DAILY
Qty: 60 TABLET | Refills: 0 | Status: SHIPPED | OUTPATIENT
Start: 2023-07-13

## 2023-07-13 RX ADMIN — RISPERIDONE 1 MG: 1 TABLET, FILM COATED ORAL at 08:42

## 2023-07-13 RX ADMIN — HYDROXYZINE HYDROCHLORIDE 50 MG: 25 TABLET, FILM COATED ORAL at 08:41

## 2023-07-13 RX ADMIN — HYDROXYZINE HYDROCHLORIDE 50 MG: 25 TABLET, FILM COATED ORAL at 15:36

## 2023-07-13 RX ADMIN — TRAZODONE HYDROCHLORIDE 50 MG: 50 TABLET ORAL at 21:35

## 2023-07-13 RX ADMIN — RISPERIDONE 1 MG: 1 TABLET, FILM COATED ORAL at 21:35

## 2023-07-13 NOTE — PLAN OF CARE
Problem: Discharge Planning  Goal: Discharge to home or other facility with appropriate resources  Outcome: Progressing     Problem: Self Harm/Suicidality  Goal: Will have no self-injury during hospital stay  Description: INTERVENTIONS:  1. Ensure constant observer at bedside with Q15M safety checks  2. Maintain a safe environment  3. Secure patient belongings  4. Ensure family/visitors adhere to safety recommendations  5. Ensure safety tray has been added to patient's diet order  6. Every shift and PRN: Re-assess suicidal risk via Frequent Screener    Outcome: Progressing     Problem: Skin/Tissue Integrity  Goal: Absence of new skin breakdown  Description: 1. Monitor for areas of redness and/or skin breakdown  2. Assess vascular access sites hourly  3. Every 4-6 hours minimum:  Change oxygen saturation probe site  4. Every 4-6 hours:  If on nasal continuous positive airway pressure, respiratory therapy assess nares and determine need for appliance change or resting period. Outcome: Progressing     Problem: Anxiety  Goal: Will report anxiety at manageable levels  Description: INTERVENTIONS:  1. Administer medication as ordered  2. Teach and rehearse alternative coping skills  3.  Provide emotional support with 1:1 interaction with staff  7/12/2023 1640 by Loida Mobley RN  Outcome: Progressing

## 2023-07-13 NOTE — GROUP NOTE
Group Therapy Note    Date: 7/13/2023    Group Start Time: 0900  Group End Time: 1000  Group Topic: Topic Group    [address] [facility]        Group Therapy Note    Attendees: 6       Patient's Goal:  To participate in mental health journey game       Status After Intervention:  Improved    Participation Level: Interactive    Participation Quality: Attentive and Sharing      Speech:  normal      Thought Process/Content: Logical      Affective Functioning: Congruent      Mood: euthymic      Level of consciousness:  Attentive      Response to Learning: Progressing to goal      Endings: None Reported    Modes of Intervention: Problem-solving      Discipline Responsible: Recreational Therapist      Signature:  Beronica Roberson

## 2023-07-13 NOTE — GROUP NOTE
Group Therapy Note    Date: 7/13/2023    Group Start Time: 1500  Group End Time: 1600  Group Topic: Recreational    [address] [facility]    [address]        Group Therapy Note    Attendees:        Patient's Goal:  To concentrate on selected task      Status After Intervention:  Improved    Participation Level: Interactive    Participation Quality: Attentive and Sharing      Speech:  normal      Thought Process/Content: Logical      Affective Functioning: Congruent      Mood: euthymic      Level of consciousness:  Attentive      Response to Learning: Progressing to goal      Endings: None Reported    Modes of Intervention: Socialization and Activity      Discipline Responsible: Recreational Therapist      Signature:  Dora Peter

## 2023-07-13 NOTE — PLAN OF CARE
Problem: Discharge Planning  Goal: Discharge to home or other facility with appropriate resources  7/13/2023 0158 by Zeeshan Bergman RN  Outcome: Progressing     Problem: Self Harm/Suicidality  Goal: Will have no self-injury during hospital stay  Description: INTERVENTIONS:  1. Ensure constant observer at bedside with Q15M safety checks  2. Maintain a safe environment  3. Secure patient belongings  4. Ensure family/visitors adhere to safety recommendations  5. Ensure safety tray has been added to patient's diet order  6. Every shift and PRN: Re-assess suicidal risk via Frequent Screener    7/13/2023 1421 by Tayo Meza RN  Outcome: Progressing  7/13/2023 0158 by Zeeshan Bergman RN  Outcome: Progressing     Problem: Skin/Tissue Integrity  Goal: Absence of new skin breakdown  Description: 1. Monitor for areas of redness and/or skin breakdown  2. Assess vascular access sites hourly  3. Every 4-6 hours minimum:  Change oxygen saturation probe site  4. Every 4-6 hours:  If on nasal continuous positive airway pressure, respiratory therapy assess nares and determine need for appliance change or resting period. 7/13/2023 0158 by Zeeshan Bergman RN  Outcome: Progressing     Problem: Anxiety  Goal: Will report anxiety at manageable levels  Description: INTERVENTIONS:  1. Administer medication as ordered  2. Teach and rehearse alternative coping skills  3. Provide emotional support with 1:1 interaction with staff  Outcome: Progressing     Problem: Coping  Goal: Pt/Family able to verbalize concerns and demonstrate effective coping strategies  Description: INTERVENTIONS:  1. Assist patient/family to identify coping skills, available support systems and cultural and spiritual values  2. Provide emotional support, including active listening and acknowledgement of concerns of patient and caregivers  3. Reduce environmental stimuli, as able  4. Instruct patient/family in relaxation techniques, as appropriate  5.  Assess

## 2023-07-14 VITALS
DIASTOLIC BLOOD PRESSURE: 67 MMHG | SYSTOLIC BLOOD PRESSURE: 130 MMHG | HEIGHT: 68 IN | RESPIRATION RATE: 18 BRPM | OXYGEN SATURATION: 100 % | TEMPERATURE: 98.6 F | BODY MASS INDEX: 25.46 KG/M2 | HEART RATE: 80 BPM | WEIGHT: 168 LBS

## 2023-07-14 LAB
HIV1 RNA # SERPL NAA+PROBE: <20 COPIES/ML
HIV1 RNA SERPL NAA+PROBE-LOG#: NORMAL LOG10COPY/ML

## 2023-07-14 PROCEDURE — 6370000000 HC RX 637 (ALT 250 FOR IP): Performed by: NURSE PRACTITIONER

## 2023-07-14 PROCEDURE — 6370000000 HC RX 637 (ALT 250 FOR IP): Performed by: PSYCHIATRY & NEUROLOGY

## 2023-07-14 RX ADMIN — RISPERIDONE 1 MG: 1 TABLET, FILM COATED ORAL at 09:16

## 2023-07-14 RX ADMIN — HYDROXYZINE HYDROCHLORIDE 50 MG: 25 TABLET, FILM COATED ORAL at 09:16
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
## 2023-07-14 NOTE — PLAN OF CARE
Problem: Self Harm/Suicidality  Goal: Will have no self-injury during hospital stay  Description: INTERVENTIONS:  1. Ensure constant observer at bedside with Q15M safety checks  2. Maintain a safe environment  3. Secure patient belongings  4. Ensure family/visitors adhere to safety recommendations  5. Ensure safety tray has been added to patient's diet order  6. Every shift and PRN: Re-assess suicidal risk via Frequent Screener    7/13/2023 1421 by Arielle Wynn RN  Outcome: Progressing     Problem: Anxiety  Goal: Will report anxiety at manageable levels  Description: INTERVENTIONS:  1. Administer medication as ordered  2. Teach and rehearse alternative coping skills  3.  Provide emotional support with 1:1 interaction with staff  7/13/2023 2314 by Jerrica Valdez RN  Outcome: Progressing  7/13/2023 1421 by Arielle Wynn RN  Outcome: Progressing

## 2023-07-14 NOTE — PLAN OF CARE
Problem: Discharge Planning  Goal: Discharge to home or other facility with appropriate resources  Outcome: Adequate for Discharge     Problem: Self Harm/Suicidality  Goal: Will have no self-injury during hospital stay  Description: INTERVENTIONS:  1. Ensure constant observer at bedside with Q15M safety checks  2. Maintain a safe environment  3. Secure patient belongings  4. Ensure family/visitors adhere to safety recommendations  5. Ensure safety tray has been added to patient's diet order  6. Every shift and PRN: Re-assess suicidal risk via Frequent Screener    Outcome: Adequate for Discharge     Problem: Skin/Tissue Integrity  Goal: Absence of new skin breakdown  Description: 1. Monitor for areas of redness and/or skin breakdown  2. Assess vascular access sites hourly  3. Every 4-6 hours minimum:  Change oxygen saturation probe site  4. Every 4-6 hours:  If on nasal continuous positive airway pressure, respiratory therapy assess nares and determine need for appliance change or resting period. Outcome: Adequate for Discharge     Problem: Anxiety  Goal: Will report anxiety at manageable levels  Description: INTERVENTIONS:  1. Administer medication as ordered  2. Teach and rehearse alternative coping skills  3. Provide emotional support with 1:1 interaction with staff  Outcome: Adequate for Discharge     Problem: Coping  Goal: Pt/Family able to verbalize concerns and demonstrate effective coping strategies  Description: INTERVENTIONS:  1. Assist patient/family to identify coping skills, available support systems and cultural and spiritual values  2. Provide emotional support, including active listening and acknowledgement of concerns of patient and caregivers  3. Reduce environmental stimuli, as able  4. Instruct patient/family in relaxation techniques, as appropriate  5.  Assess for spiritual pain/suffering and initiate Spiritual Care, Psychosocial Clinical Specialist consults as needed  Outcome:

## 2023-07-14 NOTE — PROGRESS NOTES
Pharmacist Discharge Medication Reconciliation    Discharging Provider: Sheryle Dirk, NP    Significant PMH:   Past Medical History:   Diagnosis Date    ADHD     Anxiety     Bipolar 1 disorder ([address])     Dental disorder     Depression     anxiety and depression    Depression     Depression     Headache     History of pseudoseizure      Chief Complaint for this Admission: No chief complaint on file.     Allergies: Bee venom and Methylprednisolone    Discharge Medications:   Current Discharge Medication List        START taking these medications    Details   hydrOXYzine HCl (ATARAX) 50 MG tablet Take 1 tablet by mouth 3 times daily as needed for Anxiety  Qty: 30 tablet, Refills: 0      traZODone (DESYREL) 50 MG tablet Take 1 tablet by mouth nightly as needed for Sleep  Qty: 30 tablet, Refills: 0      risperiDONE (RISPERDAL) 1 MG tablet Take 1 tablet by mouth 2 times daily  Qty: 60 tablet, Refills: 0             The patient's chart, MAR and AVS were reviewed by:    Yari Nice, PharmD, BCPS, [address] [place] [address]: [phone] ([id number])  Pharmacy: [phone] ([id number])

## 2023-07-14 NOTE — GROUP NOTE
Group Therapy Note    Date: 7/14/2023    Group Start Time: 0900  Group End Time: 1000  Group Topic: Topic Group    [address] 3 ACUTE BEHAV [facility]        Group Therapy Note    Attendees:        Patient's Goal:  To participate in relaxation activity       Status After Intervention:  Improved    Participation Level: Interactive    Participation Quality: Attentive and Sharing      Speech:  normal      Affective Functioning: Congruent      Mood: euthymic      Level of consciousness:  Attentive      Response to Learning: Progressing to goal      Endings: None Reported    Modes of Intervention: Activity      Discipline Responsible: Recreational Therapist      Signature:  Dalai Scanlon

## 2023-07-14 NOTE — CARE COORDINATION
07/14/23 0842   Short Term Goal 1   STG Goal 1 Status: Patient Appears to be    (Resolved.)   Short Term Goal 2   STG Goal 2 Status: Patient Appears to be    (Resolved.)     ERNST Henderson

## 2024-03-19 NOTE — ED NOTES
Dionisio Waggoner  : 1950  Primary: Medicare Part A And B (Medicare)  Secondary: [employer] [place] MEDICARE SUPP [facility]  [place] [address]  Phone: [phone]  Fax: [phone] Plan Frequency: 2x for 8 weeks    Plan of Care/Certification Expiration Date: 24      >PT Visit Info:  Plan Frequency: 2x for 8 weeks  Plan of Care/Certification Expiration Date: 24      Visit Count:  3    OUTPATIENT PHYSICAL THERAPY:OP NOTE TYPE: Treatment Note 3/19/2024       Episode  }Appt Desk             Treatment Diagnosis:    Vertebrogenic low back pain  Other chronic pain  Muscle weakness (generalized)  Medical/Referring Diagnosis:  Low back pain, unspecified [M54.50]  Referring Physician:  Paulo Leslie MD MD Orders:  PT Eval and Treat    Date of Onset:  Onset Date: 24 (Acute on chronic irritation)     Allergies:   Hydrocodone, Codeine, Oxycodone, Zolpidem, and Propofol  Restrictions/Precautions:  No data recordedNo data recorded   Interventions Planned (Treatment may consist of any combination of the following):    No data recorded   >Subjective Comments:  Sherif states his back was sore over the weekend, but seems to be moving to the L QL region. He feels like he is seeing decreased pain intensity since beginning PT.     >Initial:     5/10>Post Session:       4/10  Medications Last Reviewed:  3/19/2024  Updated Objective Findings:    Lumbar flexion ROM greatly improved with improved speed of movement and minimal pain reported  L SB limited and pain in L QL, no spinal pain   Treatment   MANUAL THERAPY: (15 minutes):   Joint mobilization and Soft tissue mobilization was utilized and necessary because of the patient's restricted joint motion, painful spasm, and loss of articular motion.   Date: 24    L1/2 L4/5 L5/S1 gapping mjm grade 3/4 B   S/l lumbar distraction   B hip PROM ER holds   B hip LAD mjm grade 3/4     Deferred         THERAPEUTIC  Pt returned from xray, medicated as ordered. IVF infusing well. Given water as PO challenge.

## 2024-08-13 ENCOUNTER — HOSPITAL ENCOUNTER (INPATIENT)
Facility: HOSPITAL | Age: 24
LOS: 3 days | Discharge: HOME | End: 2024-08-16
Attending: EMERGENCY MEDICINE | Admitting: PSYCHIATRY & NEUROLOGY
Payer: MEDICAID

## 2024-08-13 DIAGNOSIS — R45.851 SUICIDAL IDEATION: Primary | ICD-10-CM

## 2024-08-13 DIAGNOSIS — Z00.8 MEDICAL CLEARANCE FOR PSYCHIATRIC ADMISSION: ICD-10-CM

## 2024-08-13 PROBLEM — F25.9 SCHIZO AFFECTIVE SCHIZOPHRENIA (HCC): Status: ACTIVE | Noted: 2024-08-13

## 2024-08-13 LAB
ALBUMIN SERPL-MCNC: 4.2 G/DL (ref 3.5–5)
ALBUMIN/GLOB SERPL: 1.2 (ref 1.1–2.2)
ALP SERPL-CCNC: 80 U/L (ref 45–117)
ALT SERPL-CCNC: 38 U/L (ref 12–78)
AMPHET UR QL SCN: NEGATIVE
ANION GAP SERPL CALC-SCNC: 4 MMOL/L (ref 5–15)
APAP SERPL-MCNC: <2 UG/ML (ref 10–30)
APPEARANCE UR: CLEAR
AST SERPL-CCNC: 42 U/L (ref 15–37)
BACTERIA URNS QL MICRO: NEGATIVE /HPF
BARBITURATES UR QL SCN: NEGATIVE
BASOPHILS # BLD: 0 K/UL (ref 0–0.1)
BASOPHILS NFR BLD: 0 % (ref 0–1)
BENZODIAZ UR QL: NEGATIVE
BILIRUB SERPL-MCNC: 0.8 MG/DL (ref 0.2–1)
BILIRUB UR QL: NEGATIVE
BUN SERPL-MCNC: 11 MG/DL (ref 6–20)
BUN/CREAT SERPL: 11 (ref 12–20)
CALCIUM SERPL-MCNC: 8.9 MG/DL (ref 8.5–10.1)
CANNABINOIDS UR QL SCN: NEGATIVE
CHLORIDE SERPL-SCNC: 100 MMOL/L (ref 97–108)
CO2 SERPL-SCNC: 30 MMOL/L (ref 21–32)
COCAINE UR QL SCN: NEGATIVE
COLOR UR: NORMAL
COMMENT:: NORMAL
CREAT SERPL-MCNC: 1 MG/DL (ref 0.7–1.3)
DIFFERENTIAL METHOD BLD: ABNORMAL
EOSINOPHIL # BLD: 0.2 K/UL (ref 0–0.4)
EOSINOPHIL NFR BLD: 1 % (ref 0–7)
EPITH CASTS URNS QL MICRO: NORMAL /LPF
ERYTHROCYTE [DISTWIDTH] IN BLOOD BY AUTOMATED COUNT: 13 % (ref 11.5–14.5)
ETHANOL SERPL-MCNC: <10 MG/DL (ref 0–0.08)
GLOBULIN SER CALC-MCNC: 3.5 G/DL (ref 2–4)
GLUCOSE SERPL-MCNC: 98 MG/DL (ref 65–100)
GLUCOSE UR STRIP.AUTO-MCNC: NEGATIVE MG/DL
HCT VFR BLD AUTO: 41.7 % (ref 36.6–50.3)
HGB BLD-MCNC: 14 G/DL (ref 12.1–17)
HGB UR QL STRIP: NEGATIVE
HYALINE CASTS URNS QL MICRO: NORMAL /LPF (ref 0–5)
IMM GRANULOCYTES # BLD AUTO: 0 K/UL (ref 0–0.04)
IMM GRANULOCYTES NFR BLD AUTO: 0 % (ref 0–0.5)
KETONES UR QL STRIP.AUTO: NEGATIVE MG/DL
LEUKOCYTE ESTERASE UR QL STRIP.AUTO: NEGATIVE
LYMPHOCYTES # BLD: 3.6 K/UL (ref 0.8–3.5)
LYMPHOCYTES NFR BLD: 32 % (ref 12–49)
Lab: NORMAL
MCH RBC QN AUTO: 29.5 PG (ref 26–34)
MCHC RBC AUTO-ENTMCNC: 33.6 G/DL (ref 30–36.5)
MCV RBC AUTO: 88 FL (ref 80–99)
METHADONE UR QL: NEGATIVE
MONOCYTES # BLD: 1.3 K/UL (ref 0–1)
MONOCYTES NFR BLD: 11 % (ref 5–13)
NEUTS SEG # BLD: 6.3 K/UL (ref 1.8–8)
NEUTS SEG NFR BLD: 55 % (ref 32–75)
NITRITE UR QL STRIP.AUTO: NEGATIVE
NRBC # BLD: 0 K/UL (ref 0–0.01)
NRBC BLD-RTO: 0 PER 100 WBC
OPIATES UR QL: NEGATIVE
PCP UR QL: NEGATIVE
PH UR STRIP: 7 (ref 5–8)
PLATELET # BLD AUTO: 132 K/UL (ref 150–400)
POTASSIUM SERPL-SCNC: 3.8 MMOL/L (ref 3.5–5.1)
PROT SERPL-MCNC: 7.7 G/DL (ref 6.4–8.2)
PROT UR STRIP-MCNC: NEGATIVE MG/DL
RBC # BLD AUTO: 4.74 M/UL (ref 4.1–5.7)
RBC #/AREA URNS HPF: NORMAL /HPF (ref 0–5)
SALICYLATES SERPL-MCNC: 1.9 MG/DL (ref 2.8–20)
SODIUM SERPL-SCNC: 134 MMOL/L (ref 136–145)
SP GR UR REFRACTOMETRY: <1.005 (ref 1–1.03)
SPECIMEN HOLD: NORMAL
SPECIMEN HOLD: NORMAL
UROBILINOGEN UR QL STRIP.AUTO: 0.2 EU/DL (ref 0.2–1)
WBC # BLD AUTO: 11.4 K/UL (ref 4.1–11.1)
WBC URNS QL MICRO: NORMAL /HPF (ref 0–4)

## 2024-08-13 PROCEDURE — 6370000000 HC RX 637 (ALT 250 FOR IP): Performed by: NURSE PRACTITIONER

## 2024-08-13 PROCEDURE — 80179 DRUG ASSAY SALICYLATE: CPT

## 2024-08-13 PROCEDURE — 1240000000 HC EMOTIONAL WELLNESS R&B

## 2024-08-13 PROCEDURE — 80053 COMPREHEN METABOLIC PANEL: CPT

## 2024-08-13 PROCEDURE — 81001 URINALYSIS AUTO W/SCOPE: CPT

## 2024-08-13 PROCEDURE — 6370000000 HC RX 637 (ALT 250 FOR IP): Performed by: PSYCHIATRY & NEUROLOGY

## 2024-08-13 PROCEDURE — 80143 DRUG ASSAY ACETAMINOPHEN: CPT

## 2024-08-13 PROCEDURE — 85025 COMPLETE CBC W/AUTO DIFF WBC: CPT

## 2024-08-13 PROCEDURE — 82077 ASSAY SPEC XCP UR&BREATH IA: CPT

## 2024-08-13 PROCEDURE — 80307 DRUG TEST PRSMV CHEM ANLYZR: CPT

## 2024-08-13 PROCEDURE — 90791 PSYCH DIAGNOSTIC EVALUATION: CPT

## 2024-08-13 PROCEDURE — 99285 EMERGENCY DEPT VISIT HI MDM: CPT

## 2024-08-13 PROCEDURE — 36415 COLL VENOUS BLD VENIPUNCTURE: CPT

## 2024-08-13 RX ORDER — PRAZOSIN HYDROCHLORIDE 1 MG/1
1 CAPSULE ORAL 2 TIMES DAILY
Status: DISCONTINUED | OUTPATIENT
Start: 2024-08-13 | End: 2024-08-14

## 2024-08-13 RX ORDER — HALOPERIDOL 5 MG/1
5 TABLET ORAL EVERY 4 HOURS PRN
Status: DISCONTINUED | OUTPATIENT
Start: 2024-08-13 | End: 2024-08-16 | Stop reason: HOSPADM

## 2024-08-13 RX ORDER — HALOPERIDOL 5 MG/ML
5 INJECTION INTRAMUSCULAR EVERY 4 HOURS PRN
Status: DISCONTINUED | OUTPATIENT
Start: 2024-08-13 | End: 2024-08-16 | Stop reason: HOSPADM

## 2024-08-13 RX ORDER — ACETAMINOPHEN 325 MG/1
650 TABLET ORAL EVERY 4 HOURS PRN
Status: DISCONTINUED | OUTPATIENT
Start: 2024-08-13 | End: 2024-08-16 | Stop reason: HOSPADM

## 2024-08-13 RX ORDER — DIPHENHYDRAMINE HYDROCHLORIDE 50 MG/ML
50 INJECTION INTRAMUSCULAR; INTRAVENOUS EVERY 4 HOURS PRN
Status: DISCONTINUED | OUTPATIENT
Start: 2024-08-13 | End: 2024-08-16 | Stop reason: HOSPADM

## 2024-08-13 RX ORDER — NICOTINE 21 MG/24HR
1 PATCH, TRANSDERMAL 24 HOURS TRANSDERMAL DAILY
Status: DISCONTINUED | OUTPATIENT
Start: 2024-08-13 | End: 2024-08-16 | Stop reason: HOSPADM

## 2024-08-13 RX ORDER — HYDROXYZINE 50 MG/1
50 TABLET, FILM COATED ORAL 4 TIMES DAILY PRN
Status: DISCONTINUED | OUTPATIENT
Start: 2024-08-13 | End: 2024-08-16 | Stop reason: HOSPADM

## 2024-08-13 RX ORDER — POLYETHYLENE GLYCOL 3350 17 G/17G
17 POWDER, FOR SOLUTION ORAL DAILY PRN
Status: DISCONTINUED | OUTPATIENT
Start: 2024-08-13 | End: 2024-08-16 | Stop reason: HOSPADM

## 2024-08-13 RX ORDER — TRAZODONE HYDROCHLORIDE 50 MG/1
50 TABLET ORAL NIGHTLY PRN
Status: DISCONTINUED | OUTPATIENT
Start: 2024-08-13 | End: 2024-08-16 | Stop reason: HOSPADM

## 2024-08-13 RX ORDER — ESCITALOPRAM OXALATE 10 MG/1
5 TABLET ORAL DAILY
Status: DISCONTINUED | OUTPATIENT
Start: 2024-08-13 | End: 2024-08-14

## 2024-08-13 RX ORDER — MAGNESIUM HYDROXIDE/ALUMINUM HYDROXICE/SIMETHICONE 120; 1200; 1200 MG/30ML; MG/30ML; MG/30ML
30 SUSPENSION ORAL EVERY 6 HOURS PRN
Status: DISCONTINUED | OUTPATIENT
Start: 2024-08-13 | End: 2024-08-16 | Stop reason: HOSPADM

## 2024-08-13 RX ORDER — SENNOSIDES A AND B 8.6 MG/1
1 TABLET, FILM COATED ORAL DAILY PRN
Status: DISCONTINUED | OUTPATIENT
Start: 2024-08-13 | End: 2024-08-16 | Stop reason: HOSPADM

## 2024-08-13 RX ADMIN — HYDROXYZINE HYDROCHLORIDE 50 MG: 50 TABLET, FILM COATED ORAL at 20:42

## 2024-08-13 RX ADMIN — PRAZOSIN HYDROCHLORIDE 1 MG: 1 CAPSULE ORAL at 20:41

## 2024-08-13 RX ADMIN — PRAZOSIN HYDROCHLORIDE 1 MG: 1 CAPSULE ORAL at 12:23

## 2024-08-13 RX ADMIN — ESCITALOPRAM OXALATE 5 MG: 10 TABLET ORAL at 12:22

## 2024-08-13 ASSESSMENT — SLEEP AND FATIGUE QUESTIONNAIRES
DO YOU USE A SLEEP AID: YES
AVERAGE NUMBER OF SLEEP HOURS: 5
DO YOU HAVE DIFFICULTY SLEEPING: YES
SLEEP PATTERN: DIFFICULTY FALLING ASLEEP

## 2024-08-13 ASSESSMENT — ENCOUNTER SYMPTOMS
RHINORRHEA: 0
SORE THROAT: 0
COLOR CHANGE: 0
EYE PAIN: 0
ABDOMINAL PAIN: 0
SHORTNESS OF BREATH: 0
VOMITING: 0
DIARRHEA: 0
BACK PAIN: 0
COUGH: 0
NAUSEA: 0

## 2024-08-13 ASSESSMENT — LIFESTYLE VARIABLES
HOW MANY STANDARD DRINKS CONTAINING ALCOHOL DO YOU HAVE ON A TYPICAL DAY: 1 OR 2
HOW OFTEN DO YOU HAVE A DRINK CONTAINING ALCOHOL: MONTHLY OR LESS

## 2024-08-13 ASSESSMENT — PAIN - FUNCTIONAL ASSESSMENT: PAIN_FUNCTIONAL_ASSESSMENT: NONE - DENIES PAIN

## 2024-08-13 NOTE — PLAN OF CARE
Problem: Depression  Goal: Will be euthymic at discharge  Description: INTERVENTIONS:  1. Administer medication as ordered  2. Provide emotional support via 1:1 interaction with staff  3. Encourage involvement in milieu/groups/activities  4. Monitor for social isolation  Outcome: Progressing     Admission unit:General    Received from: ED    Admission diagnosis:Bipolar    Admission status: Voluntary     Mood/ affect/ thought process / behaviors:     Alcohol/drug: na    PTA meds verified: none    PT or consults required: na

## 2024-08-13 NOTE — ED NOTES
1:00 AM  I have evaluated the patient as the Provider in Rapid Medical Evaluation (RME). I have reviewed his vital signs and the triage nurse assessment. I have talked with the patient and any available family and advised that I am the provider in triage and have ordered the appropriate study to initiate their work up based on the clinical presentation during my assessment. I have advised that the patient will be accommodated in the Main ED as soon as possible. I have also requested to contact the triage nurse or myself immediately if the patient experiences any changes in their condition during this brief waiting period.    \"I'm here for crisis stabilization.\"  Went to [facility] but it was very busy and it induced a panic attack.  + tobacco.  + alcohol - usually 3-4 days a week - 2 or 3 drinks. + THC.  Currently out of work.      WILMAN Philip Lindsay H, PA  08/13/24 0136

## 2024-08-13 NOTE — BSMART NOTE
BSMART Liaison Team Note     LOS: 16 hours      Patient goal(s) for today: take medications as prescribed, communicate needs to staff in an appropriate manner   BSMART Liaison team focus/goals: assess needs, provide education and support     Progress note: Patient is a 24 year old male that was seen in the emergency department at [facility]. This writer met with patient face to face, with psychiatric provider Francesca Dobbins NP on Zoom. Pt provided verbal consent for mother to be present at bedside. Pt reported he has been off his medication for the past 2 months. The last time he was consistent with his medication was when he was in [facility] for 3 weeks and released this past February. Per pt, he has been on them \"off and on.\" Pt has been self-medicating with marijuana and alcohol at times. Pt admits to drinking 3-4 times a week and smoking a gram a day if he has the funds and means for it. Pt also admits to using some adderall \"here and there.\" Pt stated his last psychiatric admission was sometime last year. He has a diagnosis of Bipolar Disorder type 2, PTSD, and ADHD. He believes he may have Schizoaffective Disorder as well. Pt has services through [facility]. Mother shared last week he went for intake, and was assigned a substance use . Mother shared that pt was unstable and seeking mental health treatment, but he was sent in an uber to a rehab facility in [place] (2.5 hours away) called [facility]. Pt got there and did not go into the program, and mother filed a missing person report for 3 days. Pt was walking the streets in [place] alone until he called her on the 72 hour deyanira. Patient came back to [place] with family and went to [facility] ED for mental health evaluation. In the waiting room, pt reported having a panic attack and left. Pt came to the ED because he and his mother feel he is unstable and needs to be restarted on his medication. Mother and pt expressed concerns for

## 2024-08-13 NOTE — BSMART NOTE
BSMART assessment completed, and suicide risk level noted to be Moderate Risk .  Charge Nurse Ugo and Physician Mana notified. Concerns not observed. Mother at bedside /Alexus Prevette [phone]. Pt seeking admission and requested hydroxyzine for anxiety .

## 2024-08-13 NOTE — ED NOTES
Bedside and Verbal shift change report given to Kathia RN and DANILO Santiago (oncoming nurse) by DANILO Macias (offgoing nurse). Report included the following information Nurse Handoff Report, ED Encounter Summary, ED SBAR, Intake/Output, and MAR.

## 2024-08-13 NOTE — CONSULTS
[facility]  PSYCHIATRY CONSULT NOTE:    Name: Moises Zurita Jr.  MR#: [id number]  : 2000  ACCOUNT#: [id number]  ADMIT DATE: 2024    REASON FOR CONSULT: SI    PATIENT SEEN/  REC     HISTORY OF PRESENTING COMPLAINT:  Moises Zurita Jr. is a 24 y.o. male with PMH of    He/She is currently seen in the ED/ on the medical floor at [facility]. Upon assessment, he/she    PAST PSYCHIATRIC HISTORY:    SUBSTANCE ABUSE HISTORY:    PSYCHOSOCIAL HISTORY:    MENTAL STATUS EXAM:   Moises Zurita Jr. is a 24 y.o. White (non-) male who appears his/her stated age. He/She is cooperative with assessment questions. He/She makes fair eye contact.  No psychomotor agitation/retardation is observed. His/Her speech is normal in rate, tone, and volume. His/Her self-reported mood is \"OK\". His/Her affect is congruent with mood and situation, full range. He/She denies auditory and visual hallucinations. No paranoia or delusions are elicited with assessment. His/Her thought processes are linear and goal-directed. He/She denies suicidal and homicidal ideation. He/She is alert and oriented X 4. His/Her memory appears intact as evidenced by conversation/answers to my questions. Insight and judgment are limited/poor.    DIAGNOSTIC IMPRESSION:    ASSESSMENT/PLAN:   Moises Zurita Jr. would benefit from inpatient hospitalization for mood stabilization, medication management, and ensured patient safety. He/she agrees to this course of treatment. Bed search initiated.       Thank you for the opportunity to participate in the care of your patient. Please re-consult psychiatry as needed.

## 2024-08-13 NOTE — ED NOTES
Bedside and Verbal shift change report given to Kathia Santiago (oncoming nurse) by Colleen (offgoing nurse). Report included the following information Nurse Handoff Report, ED Encounter Summary, ED SBAR, Intake/Output, MAR, and Recent Results.

## 2024-08-13 NOTE — ED PROVIDER NOTES
[facility] EMERGENCY DEP  EMERGENCY DEPARTMENT ENCOUNTER      Pt Name: Moises Zurita Jr.  MRN: [id number]  Birthdate 2000  Date of evaluation: 8/13/2024  Provider: Sohail Adair MD    CHIEF COMPLAINT       Chief Complaint   Patient presents with    Mental Health Problem         HISTORY OF PRESENT ILLNESS   (Location/Symptom, Timing/Onset, Context/Setting, Quality, Duration, Modifying Factors, Severity)  Note limiting factors.   24-year-old male comes to the ER brought by private vehicle with his mother for mental health crisis.  He has had some suicidal ideation off and on for the past week and is here for help.  He has a history of multiple mental disorders including bipolar depression anxiety and schizoaffective.  Patient is cooperative with evaluation in the ER though he had some moments of agitation.  He is agreeable to admission and his mother is here to support him.    The history is provided by the patient.   Mental Health Problem  Presenting symptoms: agitation, depression and suicidal thoughts    Presenting symptoms: no aggressive behavior, no bizarre behavior, no delusions, no disorganized speech, no disorganized thought process, no hallucinations, no homicidal ideas, no paranoid behavior, no self-mutilation and no suicide attempt    Patient accompanied by:  Parent  Degree of incapacity (severity):  Moderate  Onset quality:  Gradual  Duration:  1 week  Timing:  Constant  Progression:  Worsening  Chronicity:  Recurrent  Context: not alcohol use, not drug abuse, not medication, not noncompliant and not recent medication change    Treatment compliance:  Untreated  Relieved by:  Nothing  Worsened by:  Nothing  Associated symptoms: no abdominal pain, no chest pain and no fatigue          Review of External Medical Records:     Nursing Notes were reviewed.    REVIEW OF SYSTEMS    (2-9 systems for level 4, 10 or more for level 5)     Review of Systems   Constitutional:  Negative for fatigue and  preliminarily interpreted by the emergency physician with the below findings:        Interpretation per the Radiologist below, if available at the time of this note:    No orders to display        LABS:  Labs Reviewed   CBC WITH AUTO DIFFERENTIAL - Abnormal; Notable for the following components:       Result Value    WBC 11.4 (*)     Platelets 132 (*)     Lymphocytes Absolute 3.6 (*)     Monocytes Absolute 1.3 (*)     All other components within normal limits   COMPREHENSIVE METABOLIC PANEL - Abnormal; Notable for the following components:    Sodium 134 (*)     Anion Gap 4 (*)     BUN/Creatinine Ratio 11 (*)     AST 42 (*)     All other components within normal limits   SALICYLATE LEVEL - Abnormal; Notable for the following components:    Salicylate Lvl 1.9 (*)     All other components within normal limits   ACETAMINOPHEN LEVEL - Abnormal; Notable for the following components:    Acetaminophen Level <2 (*)     All other components within normal limits   URINE CULTURE HOLD SAMPLE   ETHANOL   URINE DRUG SCREEN   URINALYSIS WITH MICROSCOPIC   EXTRA TUBES HOLD       All other labs were within normal range or not returned as of this dictation.    EMERGENCY DEPARTMENT COURSE and DIFFERENTIAL DIAGNOSIS/MDM:   Vitals:    Vitals:    08/13/24 0100   BP: (!) 158/92   Pulse: 76   Resp: 18   Temp: 98.3 °F (36.8 °C)   TempSrc: Oral   SpO2: 100%   Weight: 72.3 kg (159 lb 6.3 oz)   Height: 1.727 m (5' 8\")           Medical Decision Making  24-year-old male with suicidal ideation history of psychiatric issues.  He is cooperative and agreeable to admission today.  Evaluated by be panchito who agrees that he should be admitted and they are searching for a bed.  Labs were done and patient is medically cleared for psychiatric placement at 3:10 AM.            REASSESSMENT            CONSULTS:  IP CONSULT TO BSMART    PROCEDURES:  Unless otherwise noted below, none     Procedures      FINAL IMPRESSION      1. Suicidal ideation    2. Medical

## 2024-08-13 NOTE — BSMART NOTE
Patient has been accepted to [facility] 748/a by Dr. Turner. RN notified to call report.     Arik Joseph LCSW

## 2024-08-13 NOTE — ED NOTES
Bedside and Verbal shift change report given to DANILO Macias (oncoming nurse) by DANILO Woodruff (offgoing nurse). Report included the following information Nurse Handoff Report, Index, ED Encounter Summary, ED SBAR, Adult Overview, MAR, Recent Results, Cardiac Rhythm 1st Degree, Quality Measures, Neuro Assessment, and Event Log.

## 2024-08-13 NOTE — ED TRIAGE NOTES
Patient arrives POV to ED cc of bipolar episode. Patient has been having a mental health crisis for the past week, patient is off his meds for about the past month and him/his mother are concerned for his safety.     Patient went to [facility] for voluntary stay and waited too long in waiting room, patient had a panic attack in waiting room. Told to come here. Patient had a manic episode last night and was walking in the streets of [place].     Patient requests to not be committed at [facility], previous trauma from another stay there.

## 2024-08-13 NOTE — ED NOTES
0750 - Assumed care of patient from DANILO Woodruff with verbal and bedside report. Cords removed from room. Pt noted to be in a green BHU gown with 2 book bags at bedside. No note that security has come to wand patient. DANILO Woodruff called security to come wand patient. Educated patient and mom (at bedside) on safety reasons to not have belongings at bedside and need for wanding. Understanding verbalized for wanding. Pt's mom stated her book bags will stay with her. Ese Velarde, educated on need to sit within arms reach of patient for 1:1 precautions.     0804 - Security at bedside to wand patient and belongings. Pt's black book bag placed in a patient belonging bag and secured in the patient belongings locked cabinet on shelf #5.

## 2024-08-13 NOTE — ED PROVIDER NOTES
7:06 AM   Moises Zurita Jr. is a 24 y.o. male awaiting placement in a psychiatric facility with a diagnosis of   1. Suicidal ideation    2. Medical clearance for psychiatric admission    . The patient was reexamined and remains clinically stable. All needs are being met at this time. All questions from the patient and/ or family were answered. The patient will continue to be reassessed intermittently until transfer.     Patient Vitals for the past 24 hrs:   BP Temp Temp src Pulse Resp SpO2 Height Weight   08/13/24 0100 (!) 158/92 98.3 °F (36.8 °C) Oral 76 18 100 % 1.727 m (5' 8\") 72.3 kg (159 lb 6.3 oz)       DO Gonzalez Lopez Grayson S, DO  08/13/24 1505

## 2024-08-13 NOTE — ED NOTES
TRANSFER - OUT REPORT:    Verbal report given to Michelle Zurita Jr.  being transferred to [facility] for routine progression of patient care       Report consisted of patient's Situation, Background, Assessment and   Recommendations(SBAR).     Information from the following report(s) ED Encounter Summary and ED SBAR was reviewed with the receiving nurse.    [place] Fall Assessment:    Presents to emergency department  because of falls (Syncope, seizure, or loss of consciousness): No  Age > 70: No  Altered Mental Status, Intoxication with alcohol or substance confusion (Disorientation, impaired judgment, poor safety awaremess, or inability to follow instructions): No  Impaired Mobility: Ambulates or transfers with assistive devices or assistance; Unable to ambulate or transer.: No  Nursing Judgement: No          Lines:       Opportunity for questions and clarification was provided.      Patient transported with:  Tech, security

## 2024-08-13 NOTE — BSMART NOTE
Comprehensive Assessment Form Part 1      Section I - Disposition    Primary Diagnosis: Bipolar type 2 Disorder per pt's report   Secondary Diagnosis: Attention Deficit Hyperactivity Disorder per pt's report    The Medical Doctor to Psychiatrist conference was notcompleted.  The Medical Doctor is in agreement with Bsmart Clinician  disposition.  The plan is to admit pt to [facility] once medically cleared .  The medical provider consulted was Dr. Sohail Adair.  The admitting Psychiatrist will be Dr. STONE.  The admitting Diagnosis is Bipolar Disorder.  The Payor source is [facility]   This writer reviewed with the columbia suicide severity rating scale in nursing flow sheet and the risk level not completed prior to this assessment  .Based on this assessment the risk of suicide is Moderate risk , due to Pt reporting to have hx of 3 attempts prior and the plan is to seek voluntary admission into [facility] once medically cleared    Section II - Integrated Summary  Summary:    Per Triage Note:  Patient arrives POV to ED cc of bipolar episode. Patient has been having a mental health crisis for the past week, patient is off his meds for about the past month and him/his mother are concerned for his safety.      Patient went to [facility] for voluntary stay and waited too long in waiting room, patient had a panic attack in waiting room. Told to come here. Patient had a manic episode last night and was walking in the streets of [place].      Patient requests to not be committed at [facility], previous trauma from another stay there.     Today, this writer met face-to-face at bedside with Pt who is a 23 y/o male who was transported to [facility] ED by mother / Alexus Zurita. Pt consented to this interview and also consented to having his mother involved in process, but requested her to leave the room during interview. Pt presented as alert and oriented x4 endorsing SI within he last nacho without any plan or intent, although his

## 2024-08-13 NOTE — ED NOTES
Pt sent upstairs w/ security and a tech with one bag of pt belongings that contains a backpack and jacket.

## 2024-08-14 LAB
EKG ATRIAL RATE: 58 BPM
EKG DIAGNOSIS: NORMAL
EKG P AXIS: 56 DEGREES
EKG P-R INTERVAL: 122 MS
EKG Q-T INTERVAL: 384 MS
EKG QRS DURATION: 84 MS
EKG QTC CALCULATION (BAZETT): 376 MS
EKG R AXIS: 53 DEGREES
EKG T AXIS: 69 DEGREES
EKG VENTRICULAR RATE: 58 BPM

## 2024-08-14 PROCEDURE — 93010 ELECTROCARDIOGRAM REPORT: CPT | Performed by: INTERNAL MEDICINE

## 2024-08-14 PROCEDURE — 6370000000 HC RX 637 (ALT 250 FOR IP): Performed by: NURSE PRACTITIONER

## 2024-08-14 PROCEDURE — 1240000000 HC EMOTIONAL WELLNESS R&B

## 2024-08-14 PROCEDURE — 6370000000 HC RX 637 (ALT 250 FOR IP): Performed by: PSYCHIATRY & NEUROLOGY

## 2024-08-14 PROCEDURE — 93005 ELECTROCARDIOGRAM TRACING: CPT | Performed by: PSYCHIATRY & NEUROLOGY

## 2024-08-14 RX ORDER — PRAZOSIN HYDROCHLORIDE 1 MG/1
1 CAPSULE ORAL NIGHTLY
Status: DISCONTINUED | OUTPATIENT
Start: 2024-08-15 | End: 2024-08-14

## 2024-08-14 RX ORDER — ESCITALOPRAM OXALATE 10 MG/1
10 TABLET ORAL DAILY
Status: DISCONTINUED | OUTPATIENT
Start: 2024-08-15 | End: 2024-08-16 | Stop reason: HOSPADM

## 2024-08-14 RX ORDER — PRAZOSIN HYDROCHLORIDE 1 MG/1
1 CAPSULE ORAL NIGHTLY
Status: DISCONTINUED | OUTPATIENT
Start: 2024-08-14 | End: 2024-08-15

## 2024-08-14 RX ADMIN — PRAZOSIN HYDROCHLORIDE 1 MG: 1 CAPSULE ORAL at 22:12

## 2024-08-14 RX ADMIN — HYDROXYZINE HYDROCHLORIDE 50 MG: 50 TABLET, FILM COATED ORAL at 19:13

## 2024-08-14 RX ADMIN — ESCITALOPRAM OXALATE 5 MG: 10 TABLET ORAL at 08:21

## 2024-08-14 RX ADMIN — HYDROXYZINE HYDROCHLORIDE 50 MG: 50 TABLET, FILM COATED ORAL at 11:01

## 2024-08-14 RX ADMIN — PRAZOSIN HYDROCHLORIDE 1 MG: 1 CAPSULE ORAL at 08:21

## 2024-08-14 RX ADMIN — TRAZODONE HYDROCHLORIDE 50 MG: 50 TABLET ORAL at 22:10

## 2024-08-14 RX ADMIN — ACETAMINOPHEN 650 MG: 325 TABLET ORAL at 19:13

## 2024-08-14 ASSESSMENT — PAIN DESCRIPTION - LOCATION: LOCATION: HEAD

## 2024-08-14 ASSESSMENT — PAIN DESCRIPTION - DESCRIPTORS: DESCRIPTORS: ACHING

## 2024-08-14 ASSESSMENT — PAIN SCALES - GENERAL: PAINLEVEL_OUTOF10: 3

## 2024-08-14 NOTE — H&P
PSYCHIATRY EVALUATION NOTE    CHIEF COMPLAINT: \"I need to get back on my medications.\"    HISTORY OF PRESENTING COMPLAINT:  Moises Zurita Jr. is a 24 y.o. White (non-) male who is currently admitted to the general side of 7th floor behavioral health Unit at [facility].     Reji said that this was his mother's idea as he's been trying to get back on medications. He reports that he's been feeling low and depressed for about a month. He shares that he started thinking about everyone being happier without him around. He says that he's been having suicidal dreams and trauma dreams. His sleep has been varied. Appetite has been improving.    Reji reports sometimes he experiences seeing shadows. No evidence for paranomia or delusional thoughts.    No access to fire arms.    PAST PSYCHIATRIC HISTORY   4 past inpatient psychiatric admissions, last being 1 year ago.  3 suicide attempts, last being 2020.  Admits to past self-injurious behaviors in the past, last time was 7 months ago.   \"Binge eat.\" \"Anorexic.\"    Hasn't been on medications or seen providers for past month    Trazodone, lamictal, risperdal and hyroxyzine  says that it has been 'numbing.'  Lamictal says works, but gave him headache.  Adzynis worked well.  Latuda was trid on for less than a month.  Describes being on lexapro, zoloft and prozac.  Says that he had a bad recation to klonapin    SUBSTANCE USE HISTORY:  Tobacco: vapes.  Cannabis: last use 4 days ago.  Alcohol: none.  IVD: no  No Cocaine/Heroin/amphetamines/LSD/PCP/Ketamine    Allergies  Allergies   Allergen Reactions    Bee Venom Swelling    Methylprednisolone Hives     PAST MEDICAL HISTORY:    Please see H&P for details.     Past Medical History:   Diagnosis Date    ADHD     Anxiety     Bipolar 1 disorder (HCC)     Dental disorder     Depression     anxiety and depression    Depression     Depression     Headache     History of pseudoseizure      Prior to Admission medications   II, by history  PTSD, by history    PLAN:  Recommend inpatient psychiatric admission to mitigate the risk of further decompensation.  Will obtain pertinent labs and collateral information.Encourage patient to participate in programming and group activities.  Medication Regimen As follows:  Discussed restaring lexapro initially at 5mg and increasing it to 10mg po qday.  Started prazosin 1mg po qhs to addressed ptsd related nightmares.  Start nitcotine 21mg transdermal patch for replacement.    I certify that this patients inpatient psychiatric hospital services furnished since the previous certification were, and continue to be, required for treatment that could reasonably be expected to improve the patient's condition, or for diagnostic study, and that the patient continues to need, on a daily basis, active treatment furnished directly by or requiring the supervision of inpatient psychiatric facility personnel. In addition, the hospital records show that services furnished were intensive treatment services, admission or related services, or equivalent services.      A coordinated, multidisplinary treatment team round was conducted with the patient, nurses, pharmcist,  and writer present. Discussions held with , and/or with family members; Complete current electronic health record for patient was reviewed in full including consultant notes, ancillary staff notes, nurses and tech notes, labs and vitals.

## 2024-08-14 NOTE — PROGRESS NOTES
Behavioral Services  Medicare Certification Upon Admission    I certify that this patient's inpatient psychiatric hospital admission is medically necessary for:    [x] (1) Treatment which could reasonably be expected to improve this patient's condition,       [] (2) Or for diagnostic study;     AND     [x](2) The inpatient psychiatric services are provided while the individual is under the care of a physician and are included in the individualized plan of care.    Estimated length of stay/service 3-5 days.    Plan for post-hospital care Outpatient Psychiatry.    Electronically signed by Juan Turner MD on 8/14/2024 at 10:27 AM

## 2024-08-14 NOTE — CARE COORDINATION
08/14/24 1043   Suicidal Ideation   Wish to be Dead Lifetime - Yes;Past 1 month - Yes   Non-Specific Active Suicidal Thoughts Lifetime - Yes;Past 1 month - Yes   Suicidal Behavior Trigger Wish to be Dead   Active Suicidal Ideation with Any Methods (Not Plan) without Intent to Act Lifetime - Yes;Past 1 month - Yes   Active Suicidal Ideation with Some Intent to Act, without Specific Plan Lifetime - Yes;Past 1 month - No   Active Suicidal Ideation with Specific Plan and Intent Lifetime - Yes;Past 1 month - No   Intensity of Ideation   Lifetime - Most Severe Ideation 5 - most severe   Lifetime - Most Recent Ideation 4   Frequency Daily or almost daily   Duration 1-4 hours/a lot of time   Controllability Can control thoughts with some difficulty   Deterrents Deterrents probably stopped you   Reasons for Ideation Equally to get attention, revenge or a reaction from others and to end/stop the pain   Suicidal Behavior   Actual Attempt Lifetime - Yes;Past 3 months - No   Has subject engaged in Non-Suicidal Self-Injurious Behavior? Lifetime - Yes;Past 3 months - Yes   Interrupted Attempt Lifetime - Yes;Past 3 months - No   Aborted or Self-Interrupted Attempt Lifetime - Yes;Past 3 months - No   Preparatory Acts or Behavior Lifetime - No;Past 3 months - No   Actual Lethality/Medical Damage 1   Potential Lethality 2  (Hx of attempts, alcohol/substance use leading to increased impulsivity)

## 2024-08-14 NOTE — BH NOTE
GROUP THERAPY PROGRESS NOTE    Patient is participating in psychoeducation group.    Group time: 60 minutes    Personal goal for participation: to develop an understanding of shame and vulnerability    Goal orientation: Personal    Group therapy participation:  Active     Therapeutic interventions reviewed and discussed:  Group members were guided through learning about shame and vulnerability. Members gained an understanding of the importance of “leaning in” to help strengthen our self-image and how we interact with others. Members watched Britney Dawkins Talk about vulnerability and then engaged in discussion.     Impression of participation:  Pt was present and engaged in group discussion/activity. Pt was calm, cooperative.       Katherine Gillies, MSW, QMHP-A

## 2024-08-14 NOTE — BH NOTE
GROUP THERAPY PROGRESS NOTE    Patient is participating in Healthy living group.    Group time: 30 minutes    Personal goal for participation: Develop an understanding of sleep hygiene    Goal orientation: Personal    Group therapy participation: Active     Therapeutic interventions reviewed and discussed: Group members were able to develop an understanding of how sleep patterns effect mental health. Members were guided through developing an understanding of sleep hygiene. Handouts provided.    Impression of participation: Pt was present and engaged in group discussion and activity. Pt was calm, cooperative.     Katherine Gillies MSW, HP-A

## 2024-08-14 NOTE — INTERDISCIPLINARY ROUNDS
Behavioral Health Interdisciplinary Rounds     Patient Name: Moises Zurita Jr.  Age: 24 y.o.  Room/Bed:  [facility]/  Primary Diagnosis: Schizo affective schizophrenia (HCC)   Admission Status: Voluntary    Readmission within 30 days: No  Power of  in place: No  Patient requires a blocked bed: No            Sleep hours: 7       Participation in Care/Groups:  No  Medication Compliant?: Yes  PRNS (last 24 hours): Antianxiety   Restraints (last 24 hours):  No  __________________________________________________  OQ Admission Analysis Survey completed:yes   OQ Admission Analysis Survey score:86  __________________________________________________     Alcohol screening (AUDIT) completed -  yes    SCORE 1   Tobacco -:  yes   Illegal Drugs use:      24 hour chart check complete: Yes    _______________________________________________    Patient goal(s) for today: meet with treatment team   Treatment team focus/goals: Plan to assess for medications and discharge needs .    Progress note: He discussed his increased depression and wanting to get back on medications      Spiritual Care Consult: no  Financial concerns/prescription coverage: [place] medicaid   Family contact:                        Family requesting physician contact today:    Discharge plan: He will return home with family   Access to weapons :   no                                                           Outpatient provider(s): TBD       LOS:  1  Expected LOS: TBD     Participating treatment team members: Moises C Parminder Dos Santos,  GINNY Pearson- Dr. Johnny KIRKLAND RN - ERNST Gonzalez
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
## 2024-08-14 NOTE — PLAN OF CARE
Problem: ABCDS Injury Assessment  Goal: Absence of physical injury  Outcome: Progressing     Pt appears to be resting in bed in no apparent distress, respirations even and unlabored. No voiced concerns. Standard precautions maintained. Q15m rounds for safety continued per provider order.

## 2024-08-14 NOTE — CARE COORDINATION
08/14/24 0747   ITP   Date of Plan 08/14/24   Date of Next Review 08/21/24   Primary Diagnosis Code Depression   Barriers to Treatment Need for psychoeducation   Strengths Incorporated in Plan Acknowledging need for assistance;Family supports;Verbal;Seeking interactions   Short Term Goal 1   Short Term Goal 1 Client will learn and demonstrate effective coping skills   Baseline Functioning Client experiences SI   Target Client will be able to manage negative feelings so as to not have thoughts of harming self.   Objectives Client will participate in group therapy   Intervention 1 Acknowledge client strengths   Frequency daily   Measured by Behavioral data;Self report;Staff observation   Staff Responsible [facility] staff;Clinical staff   Intervention 2 Milieu therapy and support   Frequency daily   Measured by Behavioral data;Self report;Staff observation   Staff Responsible [facility] staff;Clinical staff   Intervention 3 Group therapy   Frequency daily   Measured by Behavioral data;Self report;Staff observation   Staff Responsible [facility] staff;Clinical staff   Short Term Goal 2   Short Term Goal 2 Client will maintain compliance with medication regime   Baseline Functioning Client is not taking medications in the community   Target take medications as prescribed on a daily basis   Objectives   (medication management)   Intervention 1 Monitor medications   Measured by Behavioral data;Self report;Staff observation   Staff Responsible Clinical staff   Crisis/Safety/Discharge Plan   Crisis/Safety Plan Standard program interventions and protocol   Comprehensive Assessment Completion Date 08/21/24   Discharge Plan He will be discharge home with  follow up

## 2024-08-14 NOTE — PLAN OF CARE
Problem: Depression  Goal: Will be euthymic at discharge  Description: INTERVENTIONS:  1. Administer medication as ordered  2. Provide emotional support via 1:1 interaction with staff  3. Encourage involvement in milieu/groups/activities  4. Monitor for social isolation  Outcome: Progressing     Problem: Anxiety  Goal: Will report anxiety at manageable levels  Description: INTERVENTIONS:  1. Administer medication as ordered  2. Teach and rehearse alternative coping skills  3. Provide emotional support with 1:1 interaction with staff  Outcome: Progressing
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
## 2024-08-14 NOTE — DISCHARGE INSTRUCTIONS
DISCHARGE SUMMARY    NAME:Moises Zurita Jr.  : 2000  MRN: [id number]    The patient Moises Zurita Jr. exhibits the ability to control behavior in a less restrictive environment.  Patient's level of functioning is improving.  No assaultive/destructive behavior has been observed for the past 24 hours.  No suicidal/homicidal threat or behavior has been observed for the past 24 hours.  There is no evidence of serious medication side effects.  Patient has not been in physical or protective restraints for at least the past 24 hours.    If weapons involved, how are they secured? None    Is patient aware of and in agreement with discharge plan? Yes    Arrangements for medication:  Prescriptions filled through [facility], 30 day supply provided    Copy of discharge instructions to provider?: Yes - yes fax to [facility] - [phone]     Arrangements for transportation home: Family    Keep all follow up appointments as scheduled, continue to take prescribed medications per physician instructions.  Mental health crisis number:  911 or your local mental health crisis line number at [facility] at [phone]      Mental Health Emergency WARM LINE      2-950-459-MH ([phone])      M-F: 9am to 9pm      Sat & Sun: 5pm - 9pm  National suicide prevention lines:                             6-697-NNIRCXC (1-757.558.8632)       7-064-965-TALK ([phone])    Crisis Text Line:  Text HOME to 900894        DISCHARGE SUMMARY from Nurse    PATIENT INSTRUCTIONS:      What to do at Home:  Recommended activity: activity as tolerated,     If you experience any of the following symptoms: racing or intrusive thoughts, feeling unsafe with intense anxiety, engaging in high risk behaviors, fleeting suicidal thoughts - talk with your supports sytem including family/friends and assigned providers with [facility] with Jesus Alberto Lopez and Dr. Hernandez    *  Please give a list of your current medications to

## 2024-08-14 NOTE — BH NOTE
GROUP THERAPY PROGRESS NOTE    Patient is participating in recreational therapy group.    Group time: 30 minutes    Personal goal for participation: Tree of Life    Goal orientation: Personal    Group therapy participation: Passive       Therapeutic interventions reviewed and discussed:  Group members were given a handout with a blank tree. Members followed instructions about what the tree represents. Members were able to visually represent their roots, present, strengths and goals.     Impression of participation: SW provided pts with activity to complete independently due to low participation.       Katherine Gillies, MSW, HP-A

## 2024-08-15 VITALS
RESPIRATION RATE: 16 BRPM | WEIGHT: 159.39 LBS | OXYGEN SATURATION: 98 % | BODY MASS INDEX: 24.16 KG/M2 | DIASTOLIC BLOOD PRESSURE: 84 MMHG | HEART RATE: 85 BPM | HEIGHT: 68 IN | TEMPERATURE: 97.9 F | SYSTOLIC BLOOD PRESSURE: 137 MMHG

## 2024-08-15 PROCEDURE — 1240000000 HC EMOTIONAL WELLNESS R&B

## 2024-08-15 PROCEDURE — 6370000000 HC RX 637 (ALT 250 FOR IP): Performed by: PSYCHIATRY & NEUROLOGY

## 2024-08-15 PROCEDURE — 6370000000 HC RX 637 (ALT 250 FOR IP): Performed by: NURSE PRACTITIONER

## 2024-08-15 RX ORDER — PRAZOSIN HYDROCHLORIDE 1 MG/1
2 CAPSULE ORAL NIGHTLY
Status: DISCONTINUED | OUTPATIENT
Start: 2024-08-15 | End: 2024-08-16 | Stop reason: HOSPADM

## 2024-08-15 RX ADMIN — TRAZODONE HYDROCHLORIDE 50 MG: 50 TABLET ORAL at 21:38

## 2024-08-15 RX ADMIN — ESCITALOPRAM OXALATE 10 MG: 10 TABLET ORAL at 08:54

## 2024-08-15 RX ADMIN — HYDROXYZINE HYDROCHLORIDE 50 MG: 50 TABLET, FILM COATED ORAL at 08:54

## 2024-08-15 RX ADMIN — HYDROXYZINE HYDROCHLORIDE 50 MG: 50 TABLET, FILM COATED ORAL at 20:32

## 2024-08-15 RX ADMIN — PRAZOSIN HYDROCHLORIDE 2 MG: 1 CAPSULE ORAL at 20:32

## 2024-08-15 NOTE — BH NOTE
PSYCHIATRIC PROGRESS NOTE    Chief Complaint: \"I am claming.\"      Length of Stay: 2 Days    Interval History:  Nursing report slept well. He did take his medications and tolerated them without side effects.  He report experiencing PTSD related nightmares yesterday.  He feels his anxiety is much improved.  Denies SI. Denies passive death wish.      Past Medical History:  Past Medical History:   Diagnosis Date    ADHD     Anxiety     Bipolar 1 disorder (HCC)     Dental disorder     Depression     anxiety and depression    Depression     Depression     Headache     History of pseudoseizure           escitalopram  10 mg Oral Daily    prazosin  1 mg Oral Nightly    nicotine  1 patch TransDERmal Daily       Labs:  Lab Results   Component Value Date/Time    WBC 11.4 08/13/2024 01:23 AM    HGB 14.0 08/13/2024 01:23 AM    HCT 41.7 08/13/2024 01:23 AM     08/13/2024 01:23 AM    MCV 88.0 08/13/2024 01:23 AM      Lab Results   Component Value Date/Time     08/13/2024 01:23 AM    K 3.8 08/13/2024 01:23 AM     08/13/2024 01:23 AM    CO2 30 08/13/2024 01:23 AM    BUN 11 08/13/2024 01:23 AM    GLOB 3.5 08/13/2024 01:23 AM    ALT 38 08/13/2024 01:23 AM          Vitals:    08/15/24 0844   BP: 129/78   Pulse: 75   Resp: 16   Temp: 98.2 °F (36.8 °C)   SpO2: 97%        Physical Exam:  Body habitus: Body mass index is 24.24 kg/m².  Musculoskeletal system: normal gait  Tremor - neg  Cog wheeling - neg    Mental Status Exam:  Mr Zurita is a 24 y.o. YO male is polite, cooperative and attentive.   Patient maintains eye contact throughout the evaluation  Psychomotor activity: WNL  Speech is spontaneous, with NL volume and prosody  Thought process is logical  Mood is reported as \" better\" Affect is congruent, and euthymic.  Thought content is negative for suicidal or homicidal ideations, intents or plans  Denies experiencing hallucinations of any type  Perception is negative for paranoia or delusional

## 2024-08-15 NOTE — BH NOTE
PRN Medication Documentation    Specific patient behavior that led to need for PRN medication: pt verbalized anxiety \"4\" /10 and asked for anxiety prn med  Staff interventions attempted prior to PRN being given: encouraged coping skills, distraction  PRN medication given: atarax 50 mg prn  Patient response/effectiveness of PRN medication: some effect

## 2024-08-15 NOTE — BH NOTE
GROUP THERAPY PROGRESS NOTE    Patient is participating in recreational therapy group.    Group time: 30 minutes    Personal goal for participation: To engage in “finish the phrase” and other puzzle activities.    Goal orientation: Personal    Group therapy participation: Active     Therapeutic interventions reviewed and discussed:  Group members were given the opportunity to engage in the “finish the phrase” activity. Members were able to exercise socialization and memory skills. Members interacted with peers. Handout provided.     Impression of participation: Pt was present and engaged in group activity. Pt added to group discussion. Pt was calm, cooperative.      Katherine Gillies, MSW, HP-A

## 2024-08-15 NOTE — PLAN OF CARE
Problem: Anxiety  Goal: Will report anxiety at manageable levels  Description: INTERVENTIONS:  1. Administer medication as ordered  2. Teach and rehearse alternative coping skills  3. Provide emotional support with 1:1 interaction with staff  Outcome: Progressing     Problem: Safety - Adult  Goal: Free from fall injury  8/15/2024 0042 by Michelle Long LPN  Outcome: Progressing    Patient seen in treatment team, states his anxiety is still present, decreasing.  Nightmares still present.  Med compliant, will increase prazosin.    Patient awake and alert and present in the milieu.   Interacts well with staff and peers.   Med and meal compliant.    Patient denies SI at this time.   Will continue to monitor q15 minutes for safety checks.

## 2024-08-15 NOTE — BH NOTE
GROUP THERAPY PROGRESS NOTE    Patient is participating in Self-care group.    Group time: 30 minutes    Personal goal for participation: To gain an understanding of the importance of self-awareness.    Goal orientation: Personal    Group therapy participation: Active     Therapeutic interventions reviewed and discussed:  Group members were guided through developing an understanding of how self-awareness contributes to our ability to cope with life stressors. Members given the opportunity to complete self-awareness assessment. VIDEO. Handouts provided    Impression of participation: Pt was present and engaged in group discussion/activity. Pt was calm, cooperative.       Katherine Gillies, MSW, [facility]-A

## 2024-08-15 NOTE — BH NOTE
GROUP THERAPY PROGRESS NOTE    Patient is participating in psychoeducation group.    Group time: 30 minutes    Personal goal for participation: to gain an understanding of boundaries in our relationships    Goal orientation: Personal    Group therapy participation: passive      Therapeutic interventions reviewed and discussed:  Group members were guided through learning about the importance of boundaries. Members gained an understanding of what boundaries are, how to set them, and the differences between healthy and unhealthy boundaries through watching a video. Handouts provided.    Impression of participation: SW provided pt with handouts to complete independently due to low participation.    Katherine Gillies, MSW, HP-A

## 2024-08-15 NOTE — INTERDISCIPLINARY ROUNDS
Behavioral Health Interdisciplinary Rounds     Patient Name: Moises QUEEN Joannleslee Dos Santos  Age: 24 y.o.  Room/Bed:  [facility]/  Primary Diagnosis: Schizo affective schizophrenia (HCC)   Admission Status: Voluntary    Readmission within 30 days: No  Power of  in place: No  Patient requires a blocked bed: No        Participation in Care/Groups:  Yes  Medication Compliant?: Yes  PRNS (last 24 hours): Antianxiety, Sleep Aid, and Pain   Restraints (last 24 hours):  No  __________________________________________________    24 hour chart check complete: Yes  _______________________________________________    Patient goal(s) for today: met with treatment team   Treatment team focus/goals: plan to increase his medications   Progress note: He reports he was able to get sleep last night , he has been up and out on the unit and participating in unit activities -      Spiritual Care Consult: no  Financial concerns/prescription coverage:  medicaid   Family contact:    he signed a LUANN for his mom         SW spoke to his mom and updated her on the treatment plan and discharge                Family requesting physician contact today:    Discharge plan: he will return home with family   Access to weapons :    no                                                          Outpatient provider(s): [facility]       LOS:  2  Expected LOS:     Participating treatment team members: Moises Zurita Jr., GINNY Pearson- Dr. Johnny Maciel RN

## 2024-08-16 PROCEDURE — 6370000000 HC RX 637 (ALT 250 FOR IP): Performed by: NURSE PRACTITIONER

## 2024-08-16 PROCEDURE — 6370000000 HC RX 637 (ALT 250 FOR IP): Performed by: PSYCHIATRY & NEUROLOGY

## 2024-08-16 RX ORDER — PRAZOSIN HYDROCHLORIDE 2 MG/1
2 CAPSULE ORAL NIGHTLY
Qty: 30 CAPSULE | Refills: 0 | Status: SHIPPED | OUTPATIENT
Start: 2024-08-16 | End: 2024-09-15

## 2024-08-16 RX ORDER — NICOTINE 21 MG/24HR
1 PATCH, TRANSDERMAL 24 HOURS TRANSDERMAL DAILY
Qty: 28 PATCH | Refills: 0 | Status: SHIPPED | OUTPATIENT
Start: 2024-08-17 | End: 2024-09-14

## 2024-08-16 RX ORDER — TRAZODONE HYDROCHLORIDE 50 MG/1
50 TABLET ORAL NIGHTLY PRN
Qty: 30 TABLET | Refills: 0 | Status: SHIPPED | OUTPATIENT
Start: 2024-08-16 | End: 2024-09-15

## 2024-08-16 RX ORDER — HYDROXYZINE 50 MG/1
50 TABLET, FILM COATED ORAL 2 TIMES DAILY PRN
Qty: 60 TABLET | Refills: 0 | Status: SHIPPED | OUTPATIENT
Start: 2024-08-16 | End: 2024-09-15

## 2024-08-16 RX ORDER — ESCITALOPRAM OXALATE 10 MG/1
10 TABLET ORAL DAILY
Qty: 30 TABLET | Refills: 0 | Status: SHIPPED | OUTPATIENT
Start: 2024-08-17 | End: 2024-09-16

## 2024-08-16 RX ADMIN — ESCITALOPRAM OXALATE 10 MG: 10 TABLET ORAL at 09:18

## 2024-08-16 RX ADMIN — HYDROXYZINE HYDROCHLORIDE 50 MG: 50 TABLET, FILM COATED ORAL at 09:19

## 2024-08-16 NOTE — DISCHARGE SUMMARY
DISCHARGE SUMMARY    Some parts of the discharge summary are from the initial Psychiatric interview that was done on admission by the admitting psychiatrist.      Date of Admission: 8/13/2024    Date of Discharge:8/16/2024     TYPE OF DISCHARGE:   REGULAR -  YES    ADMISSION EVALUATION:  HISTORY OF PRESENTING COMPLAINT:  Moises Zurita Jr. is a 24 y.o. White (non-) male who is currently admitted to the general side of 7th floor behavioral health Unit at [facility].      Reji said that this was his mother's idea as he's been trying to get back on medications. He reports that he's been feeling low and depressed for about a month. He shares that he started thinking about everyone being happier without him around. He says that he's been having suicidal dreams and trauma dreams. His sleep has been varied. Appetite has been improving.     Reji reports sometimes he experiences seeing shadows. No evidence for paranomia or delusional thoughts.     No access to fire arms.     PAST PSYCHIATRIC HISTORY   4 past inpatient psychiatric admissions, last being 1 year ago.  3 suicide attempts, last being 2020.  Admits to past self-injurious behaviors in the past, last time was 7 months ago.              \"Binge eat.\" \"Anorexic.\"     Hasn't been on medications or seen providers for past month     Trazodone, lamictal, risperdal and hyroxyzine  says that it has been 'numbing.'  Lamictal says works, but gave him headache.  Adzynis worked well.  Latuda was trid on for less than a month.  Describes being on lexapro, zoloft and prozac.  Says that he had a bad recation to klonapin     SUBSTANCE USE HISTORY:  Tobacco: vapes.  Cannabis: last use 4 days ago.  Alcohol: none.  IVD: no  No Cocaine/Heroin/amphetamines/LSD/PCP/Ketamine     Allergies  Allergies        Allergies   Allergen Reactions    Bee Venom Swelling    Methylprednisolone Hives         PAST MEDICAL HISTORY:     Please see H&P for details.      Past Medical

## 2024-08-16 NOTE — BH NOTE
PRN Medication Documentation    Specific patient behavior that led to need for PRN medication: Insomnia  Staff interventions attempted prior to PRN being given: Therapeutic talk  PRN medication given: Trazodone  Patient response/effectiveness of PRN medication: SAM aware

## 2024-08-16 NOTE — INTERDISCIPLINARY ROUNDS
Behavioral Health Interdisciplinary Rounds     Patient Name: Moises Zurita Jr.  Age: 24 y.o.  Room/Bed:  [facility]/  Primary Diagnosis: Schizo affective schizophrenia (HCC)   Admission Status: Voluntary    Readmission within 30 days: No  Power of  in place: No  Patient requires a blocked bed: No          Reason for blocked bed:   Sleep hours:        Participation in Care/Groups:  Yes  Medication Compliant?: Yes  PRNS (last 24 hours): Atarax, Trazodone   Restraints (last 24 hours):  No  __________________________________________________  OQ Admission Analysis Survey completed:  OQ Admission Analysis Survey score:  __________________________________________________     Alcohol screening (AUDIT) completed -     If applicable, date SBIRT discussed in treatment team AND documented:    Tobacco - patient is a smoker:    Illegal Drugs use:      24 hour chart check complete: Yes    _______________________________________________    Patient goal(s) for today:   Treatment team focus/goals:   Progress note: Patient met with treatment team and appeared with a fair mood and affect. He denies SI/HI and AHVH at this time, denies side effects from medications and reports anxiety is reduced, rating it at a 2/10. He is feeling moods are stable for discharge, plan to discharge home this afternoon, 30 days worth of medications filled through [facility], family to , appointment coordinated with [facility].    LOS:  3  Expected LOS: 3    Participating treatment team members: Moises BERNICE Parminder Dos Santos, Una Maharaj MSW, Michelle LEIGH RN, Dr. Juan Turner

## 2024-08-16 NOTE — PLAN OF CARE
Problem: Anxiety  Goal: Will report anxiety at manageable levels  Description: INTERVENTIONS:  1. Administer medication as ordered  2. Teach and rehearse alternative coping skills  3. Provide emotional support with 1:1 interaction with staff  8/15/2024 2330 by Tico Walker, RN  Outcome: Progressing     Problem: Depression  Goal: Will be euthymic at discharge  Description: INTERVENTIONS:  1. Administer medication as ordered  2. Provide emotional support via 1:1 interaction with staff  3. Encourage involvement in milieu/groups/activities  4. Monitor for social isolation  8/15/2024 2330 by Tico Walker, RN  Outcome: Progressing     Pt received resting in bed, eyes closed, assumed to be sleeping. Respirations even and unlabored, no signs of distress. Q15 minute safety rounds continue.

## 2024-08-16 NOTE — PLAN OF CARE
Problem: Discharge Planning  Goal: Discharge to home or other facility with appropriate resources  Outcome: Progressing  Flowsheets (Taken 8/16/2024 8454)  Discharge to home or other facility with appropriate resources:   Identify barriers to discharge with patient and caregiver   Arrange for needed discharge resources and transportation as appropriate   Identify discharge learning needs (meds, wound care, etc)   Refer to discharge planning if patient needs post-hospital services based on physician order or complex needs related to functional status, cognitive ability or social support system

## 2024-08-16 NOTE — BH NOTE
PRN Medication Documentation    Specific patient behavior that led to need for PRN medication: Anxiety  Staff interventions attempted prior to PRN being given: Therapeutic talk  PRN medication given: Atarax  Patient response/effectiveness of PRN medication: TL aware

## 2024-08-16 NOTE — BH NOTE
GROUP THERAPY PROGRESS NOTE    Patient is participating in recreational therapy group.    Group time: 30 minutes    Personal goal for participation: To engage in independent recreation activity.    Goal orientation: Personal    Group therapy participation: passive     Therapeutic interventions reviewed and discussed:  Group members are given an opportunity for individual recreation. Pts can choose any appropriate activity.     Impression of participation: Pt was engaged in independent recreation including puzzles, cards, games, tv, drawing/coloring, reading, etc.      Katherine Gillies, MSW, [facility]

## 2024-08-16 NOTE — PLAN OF CARE
Problem: Depression  Goal: Will be euthymic at discharge  Description: INTERVENTIONS:  1. Administer medication as ordered  2. Provide emotional support via 1:1 interaction with staff  3. Encourage involvement in milieu/groups/activities  4. Monitor for social isolation  Outcome: Progressing     Problem: Safety - Adult  Goal: Free from fall injury  Outcome: Progressing

## 2024-08-16 NOTE — BH NOTE
2001, 2001, 09/05/2002    Influenza Virus Vaccine 12/18/2007, 01/15/2008, 11/08/2010, 10/07/2013    Influenza, FLUARIX, FLULAVAL, FLUZONE (age 6 mo+) and AFLURIA, (age 3 y+), Quadv PF, 0.5mL 10/06/2017, 11/14/2018    MMR, PRIORIX, M-M-R II, (age 12m+), SC, 0.5mL 06/12/2002, 08/23/2006    Meningococcal ACWY, MENVEO (MenACWY-CRM), (age 2m-55y), IM, 0.5mL 10/31/2016, 03/20/2018    PPD Test 10/31/2016, 02/20/2017, 04/17/2019    Pneumococcal Vaccine 2001, 2001, 2001, 06/18/2002    Polio Virus Vaccine 2001, 2001, 02/26/2002, 08/23/2006    TDaP, ADACEL (age 10y-64y), BOOSTRIX (age 10y+), IM, 0.5mL 04/19/2012, 04/16/2022    Varicella, VARIVAX, (age 12m+), SC, 0.5mL 02/26/2002, 10/21/2010     Influenza Vaccination Status: None of the above/Not documented/Unable to determine from medical record documentation    Screening for Metabolic Disorders for Patients on Antipsychotic Medications  (Data obtained from the EMR)    Estimated Body Mass Index  Body mass index is 24.24 kg/m².      Vital Signs/Blood Pressure  /84   Pulse 85   Temp 97.9 °F (36.6 °C) (Oral)   Resp 16   Ht 1.727 m (5' 8\")   Wt 72.3 kg (159 lb 6.3 oz)   SpO2 98%   BMI 24.24 kg/m²      Fasting Blood Glucose or Hemoglobin A1c  No results found for: \"GLU\", \"GLUCPOC\"    Hemoglobin A1C   Date Value Ref Range Status   07/11/2023 4.7 4.0 - 5.6 % Final     Comment:     NEW METHOD  PLEASE NOTE NEW REFERENCE RANGE  (NOTE)  HbA1C Interpretive Ranges  <5.7              Normal  5.7 - 6.4         Consider Prediabetes  >6.5              Consider Diabetes         Discharge Diagnosis: MDD, Recurrent, Severe, w/o psychosis, PTSD, unspecified    Discharge Plan/Destination: The patient Moises Zurita Jr. exhibits the ability to control behavior in a less restrictive environment.  Patient's level of functioning is improving.  No assaultive/destructive behavior has been observed for the past 24 hours.  No suicidal/homicidal  threat or behavior has been observed for the past 24 hours.  There is no evidence of serious medication side effects.  Patient has not been in physical or protective restraints for at least the past 24 hours.    If weapons involved, how are they secured? None    Is patient aware of and in agreement with discharge plan? Yes    Arrangements for medication:  Prescriptions filled through [facility], 30 day supply provided    Copy of discharge instructions to provider?: Yes - yes fax to [facility] - [phone]     Arrangements for transportation home: Family    Keep all follow up appointments as scheduled, continue to take prescribed medications per physician instructions.  Mental health crisis number:  911 or your local mental health crisis line number at [facility] at [phone]    Discharge Medication List and Instructions:      Medication List        START taking these medications      escitalopram 10 MG tablet  Commonly known as: LEXAPRO  Take 1 tablet by mouth daily  Start taking on: August 17, 2024     nicotine 21 MG/24HR  Commonly known as: NICODERM CQ  Place 1 patch onto the skin daily for 28 days  Start taking on: August 17, 2024     prazosin 2 MG capsule  Commonly known as: MINIPRESS  Take 1 capsule by mouth nightly            CHANGE how you take these medications      hydrOXYzine HCl 50 MG tablet  Commonly known as: ATARAX  Take 1 tablet by mouth 2 times daily as needed for Anxiety (may be added at night to help with sleep and to be used instead of benadryl)  What changed:   when to take this  reasons to take this            CONTINUE taking these medications      traZODone 50 MG tablet  Commonly known as: DESYREL  Take 1 tablet by mouth nightly as needed for Sleep            STOP taking these medications      risperiDONE 1 MG tablet  Commonly known as: RISPERDAL               Where to Get Your Medications        These medications were sent to [facility] [place], [place]

## 2024-08-16 NOTE — BH NOTE
GROUP THERAPY PROGRESS NOTE    Patient is participating in psychoeducation group.    Group time: 60 minutes    Personal goal for participation: To develop an understanding of Attention-Deficit Hyperactivity-Disorder (ADHD) and how it manifests in Adults.     Goal orientation: Personal    Group therapy participation: active     Therapeutic interventions reviewed and discussed:  Group members were provided information about adult ADHD. Members were able to watch a video and complete an assessment to determine symptoms of ADHD. Members were then introduced to ideas of how to reduce symptoms and behaviors related to ADHD. Handouts provided.     Impression of participation:  Pt was present and engaged in group discussion. Pt added insight to group topic. Pt interacted with SW and peers. Pt was calm, cooperative.       Katherine Gillies, MSW, [facility]         show

## 2024-11-05 ENCOUNTER — APPOINTMENT (OUTPATIENT)
Facility: HOSPITAL | Age: 24
End: 2024-11-05
Payer: MEDICAID

## 2024-11-05 ENCOUNTER — HOSPITAL ENCOUNTER (EMERGENCY)
Facility: HOSPITAL | Age: 24
Discharge: HOME | End: 2024-11-05
Attending: STUDENT IN AN ORGANIZED HEALTH CARE EDUCATION/TRAINING PROGRAM
Payer: MEDICAID

## 2024-11-05 VITALS
HEIGHT: 68 IN | WEIGHT: 170 LBS | RESPIRATION RATE: 16 BRPM | HEART RATE: 85 BPM | BODY MASS INDEX: 25.76 KG/M2 | OXYGEN SATURATION: 94 % | TEMPERATURE: 97.6 F

## 2024-11-05 DIAGNOSIS — R10.13 EPIGASTRIC PAIN: Primary | ICD-10-CM

## 2024-11-05 LAB
ALBUMIN SERPL-MCNC: 4.3 G/DL (ref 3.5–5)
ALBUMIN/GLOB SERPL: 1.1 (ref 1.1–2.2)
ALP SERPL-CCNC: 71 U/L (ref 45–117)
ALT SERPL-CCNC: 25 U/L (ref 12–78)
ANION GAP SERPL CALC-SCNC: 9 MMOL/L (ref 2–12)
APPEARANCE UR: CLEAR
AST SERPL-CCNC: 21 U/L (ref 15–37)
BACTERIA URNS QL MICRO: NEGATIVE /HPF
BASOPHILS # BLD: 0 K/UL (ref 0–0.1)
BASOPHILS NFR BLD: 0 % (ref 0–1)
BILIRUB SERPL-MCNC: 0.3 MG/DL (ref 0.2–1)
BILIRUB UR QL: NEGATIVE
BUN SERPL-MCNC: 8 MG/DL (ref 6–20)
BUN/CREAT SERPL: 8 (ref 12–20)
CALCIUM SERPL-MCNC: 8.7 MG/DL (ref 8.5–10.1)
CHLORIDE SERPL-SCNC: 104 MMOL/L (ref 97–108)
CO2 SERPL-SCNC: 29 MMOL/L (ref 21–32)
COLOR UR: NORMAL
CREAT SERPL-MCNC: 0.97 MG/DL (ref 0.7–1.3)
DIFFERENTIAL METHOD BLD: ABNORMAL
EKG ATRIAL RATE: 74 BPM
EKG DIAGNOSIS: NORMAL
EKG P AXIS: 75 DEGREES
EKG P-R INTERVAL: 146 MS
EKG Q-T INTERVAL: 368 MS
EKG QRS DURATION: 86 MS
EKG QTC CALCULATION (BAZETT): 408 MS
EKG R AXIS: 57 DEGREES
EKG T AXIS: 67 DEGREES
EKG VENTRICULAR RATE: 74 BPM
EOSINOPHIL # BLD: 0.3 K/UL (ref 0–0.4)
EOSINOPHIL NFR BLD: 3 % (ref 0–7)
EPITH CASTS URNS QL MICRO: NORMAL /LPF
ERYTHROCYTE [DISTWIDTH] IN BLOOD BY AUTOMATED COUNT: 12.3 % (ref 11.5–14.5)
GLOBULIN SER CALC-MCNC: 3.9 G/DL (ref 2–4)
GLUCOSE SERPL-MCNC: 102 MG/DL (ref 65–100)
GLUCOSE UR STRIP.AUTO-MCNC: NEGATIVE MG/DL
HCT VFR BLD AUTO: 44.9 % (ref 36.6–50.3)
HGB BLD-MCNC: 15.4 G/DL (ref 12.1–17)
HGB UR QL STRIP: NEGATIVE
IMM GRANULOCYTES # BLD AUTO: 0 K/UL (ref 0–0.04)
IMM GRANULOCYTES NFR BLD AUTO: 0 % (ref 0–0.5)
KETONES UR QL STRIP.AUTO: NEGATIVE MG/DL
LEUKOCYTE ESTERASE UR QL STRIP.AUTO: NEGATIVE
LIPASE SERPL-CCNC: 27 U/L (ref 13–75)
LYMPHOCYTES # BLD: 4.3 K/UL (ref 0.8–3.5)
LYMPHOCYTES NFR BLD: 47 % (ref 12–49)
MCH RBC QN AUTO: 30.1 PG (ref 26–34)
MCHC RBC AUTO-ENTMCNC: 34.3 G/DL (ref 30–36.5)
MCV RBC AUTO: 87.7 FL (ref 80–99)
MONOCYTES # BLD: 0.8 K/UL (ref 0–1)
MONOCYTES NFR BLD: 9 % (ref 5–13)
NEUTS SEG # BLD: 3.7 K/UL (ref 1.8–8)
NEUTS SEG NFR BLD: 40 % (ref 32–75)
NITRITE UR QL STRIP.AUTO: NEGATIVE
NRBC # BLD: 0 K/UL (ref 0–0.01)
NRBC BLD-RTO: 0 PER 100 WBC
PH UR STRIP: 6 (ref 5–8)
PLATELET # BLD AUTO: 152 K/UL (ref 150–400)
POTASSIUM SERPL-SCNC: 4.2 MMOL/L (ref 3.5–5.1)
PROT SERPL-MCNC: 8.2 G/DL (ref 6.4–8.2)
PROT UR STRIP-MCNC: NEGATIVE MG/DL
RBC # BLD AUTO: 5.12 M/UL (ref 4.1–5.7)
RBC #/AREA URNS HPF: NORMAL /HPF (ref 0–5)
SODIUM SERPL-SCNC: 142 MMOL/L (ref 136–145)
SP GR UR REFRACTOMETRY: 1.01 (ref 1–1.03)
TROPONIN I SERPL HS-MCNC: <4 NG/L (ref 0–76)
URINE CULTURE IF INDICATED: NORMAL
UROBILINOGEN UR QL STRIP.AUTO: 0.2 EU/DL (ref 0.2–1)
WBC # BLD AUTO: 9.1 K/UL (ref 4.1–11.1)
WBC URNS QL MICRO: NORMAL /HPF (ref 0–4)

## 2024-11-05 PROCEDURE — 96374 THER/PROPH/DIAG INJ IV PUSH: CPT

## 2024-11-05 PROCEDURE — 93005 ELECTROCARDIOGRAM TRACING: CPT | Performed by: STUDENT IN AN ORGANIZED HEALTH CARE EDUCATION/TRAINING PROGRAM

## 2024-11-05 PROCEDURE — 99285 EMERGENCY DEPT VISIT HI MDM: CPT

## 2024-11-05 PROCEDURE — 71046 X-RAY EXAM CHEST 2 VIEWS: CPT

## 2024-11-05 PROCEDURE — 85025 COMPLETE CBC W/AUTO DIFF WBC: CPT

## 2024-11-05 PROCEDURE — 6360000004 HC RX CONTRAST MEDICATION: Performed by: STUDENT IN AN ORGANIZED HEALTH CARE EDUCATION/TRAINING PROGRAM

## 2024-11-05 PROCEDURE — 96375 TX/PRO/DX INJ NEW DRUG ADDON: CPT

## 2024-11-05 PROCEDURE — 84484 ASSAY OF TROPONIN QUANT: CPT

## 2024-11-05 PROCEDURE — 74177 CT ABD & PELVIS W/CONTRAST: CPT

## 2024-11-05 PROCEDURE — 81001 URINALYSIS AUTO W/SCOPE: CPT

## 2024-11-05 PROCEDURE — 6360000002 HC RX W HCPCS: Performed by: STUDENT IN AN ORGANIZED HEALTH CARE EDUCATION/TRAINING PROGRAM

## 2024-11-05 PROCEDURE — 80053 COMPREHEN METABOLIC PANEL: CPT

## 2024-11-05 PROCEDURE — 83690 ASSAY OF LIPASE: CPT

## 2024-11-05 PROCEDURE — 36415 COLL VENOUS BLD VENIPUNCTURE: CPT

## 2024-11-05 RX ORDER — KETOROLAC TROMETHAMINE 30 MG/ML
15 INJECTION, SOLUTION INTRAMUSCULAR; INTRAVENOUS ONCE
Status: COMPLETED | OUTPATIENT
Start: 2024-11-05 | End: 2024-11-05

## 2024-11-05 RX ORDER — ONDANSETRON 2 MG/ML
4 INJECTION INTRAMUSCULAR; INTRAVENOUS ONCE
Status: COMPLETED | OUTPATIENT
Start: 2024-11-05 | End: 2024-11-05

## 2024-11-05 RX ORDER — IOPAMIDOL 755 MG/ML
100 INJECTION, SOLUTION INTRAVASCULAR
Status: COMPLETED | OUTPATIENT
Start: 2024-11-05 | End: 2024-11-05

## 2024-11-05 RX ADMIN — ONDANSETRON 4 MG: 2 INJECTION INTRAMUSCULAR; INTRAVENOUS at 03:08

## 2024-11-05 RX ADMIN — KETOROLAC TROMETHAMINE 15 MG: 30 INJECTION, SOLUTION INTRAMUSCULAR at 03:10

## 2024-11-05 RX ADMIN — IOPAMIDOL 100 ML: 755 INJECTION, SOLUTION INTRAVENOUS at 02:26

## 2024-11-05 ASSESSMENT — PAIN - FUNCTIONAL ASSESSMENT
PAIN_FUNCTIONAL_ASSESSMENT: 0-10
PAIN_FUNCTIONAL_ASSESSMENT: ACTIVITIES ARE NOT PREVENTED
PAIN_FUNCTIONAL_ASSESSMENT: ACTIVITIES ARE NOT PREVENTED

## 2024-11-05 ASSESSMENT — ENCOUNTER SYMPTOMS: ABDOMINAL PAIN: 1

## 2024-11-05 ASSESSMENT — PAIN DESCRIPTION - ORIENTATION
ORIENTATION: LOWER
ORIENTATION: LOWER

## 2024-11-05 ASSESSMENT — PAIN DESCRIPTION - DESCRIPTORS
DESCRIPTORS: DISCOMFORT
DESCRIPTORS: DISCOMFORT

## 2024-11-05 ASSESSMENT — PAIN SCALES - GENERAL
PAINLEVEL_OUTOF10: 3
PAINLEVEL_OUTOF10: 3

## 2024-11-05 ASSESSMENT — PAIN DESCRIPTION - ONSET
ONSET: GRADUAL
ONSET: GRADUAL

## 2024-11-05 ASSESSMENT — PAIN DESCRIPTION - PAIN TYPE
TYPE: ACUTE PAIN
TYPE: ACUTE PAIN

## 2024-11-05 ASSESSMENT — PAIN DESCRIPTION - LOCATION
LOCATION: GROIN
LOCATION: GROIN

## 2024-11-05 ASSESSMENT — PAIN DESCRIPTION - FREQUENCY
FREQUENCY: CONTINUOUS
FREQUENCY: CONTINUOUS

## 2024-11-05 NOTE — ED PROVIDER NOTES
normal range or not returned as of this dictation.    EMERGENCY DEPARTMENT COURSE and DIFFERENTIAL DIAGNOSIS/MDM:   Vitals:    Vitals:    11/05/24 0153   Pulse: 85   Resp: 16   Temp: 97.6 °F (36.4 °C)   TempSrc: Oral   SpO2: 94%   Weight: 77.1 kg (170 lb)   Height: 1.727 m (5' 8\")           Medical Decision Making  Differential diagnosis includes but not limited to gastritis, reflux, acute appendicitis, pyelonephritis UTI less likely ACS.  Patient well-appearing exam, afebrile abdomen soft nontender no peritoneal signs laboratory workup included a CBC CMP which are reassuring, troponin not elevated lipase normal urinalysis shows no signs UTI CT of the abdomen pelvis is normal chest x-ray normal.      Upon reevaluation of patient he is currently resting comfortably in bed listening to his Mary Grimaldo vinyl record in the room which he brought his own record player for as well.  Patient stable for discharge home outpatient follow-up.    Amount and/or Complexity of Data Reviewed  Labs: ordered.  Radiology: ordered.  ECG/medicine tests: ordered.    Risk  Prescription drug management.            REASSESSMENT     ED Course as of 11/05/24 0322   Tue Nov 05, 2024   0205 ECG performed at 1:59 AM shows normal sinus rhythm, ventricular rate 74 normal axis normal intervals, no STEMI [WG]      ED Course User Index  [WG] Pedro Smith DO           CONSULTS:  None    PROCEDURES:  Unless otherwise noted below, none     Procedures      FINAL IMPRESSION      1. Epigastric pain          DISPOSITION/PLAN   DISPOSITION Decision To Discharge 11/05/2024 03:02:47 AM      PATIENT REFERRED TO:  [facility]  [facility] [facility]  [address]  [facility] [address] [phone]        [facility]  [address] [place] [id number] [phone]  Schedule an appointment as soon as possible for a visit in 1 day        DISCHARGE MEDICATIONS:  New

## 2024-11-05 NOTE — ED NOTES
The patient was discharged home by Dr. Smith and peyton Wei in stable condition. The patient is alert and oriented, is in no respiratory distress and has vital signs within normal limits . The patient's diagnosis, condition and treatment were explained to patient. The patient expressed understanding. No prescriptions given to pt. No work/school note given to pt. A discharge plan has been developed. A  was not involved in the process. Aftercare instructions were given to the patient. Pt's saline lock removed without complications.

## 2024-11-05 NOTE — ED TRIAGE NOTES
Pt complaining of right sided groin pain that started 2 weeks ago. Pt denies difficulty urinating. Pt ambulatory to room with steady gait. Skin pink, warm and dry

## 2024-11-29 ENCOUNTER — HOSPITAL ENCOUNTER (EMERGENCY)
Facility: HOSPITAL | Age: 24
Discharge: ANOTHER HOSPITAL | End: 2024-11-30
Attending: STUDENT IN AN ORGANIZED HEALTH CARE EDUCATION/TRAINING PROGRAM
Payer: MEDICAID

## 2024-11-29 DIAGNOSIS — R45.851 DEPRESSION WITH SUICIDAL IDEATION: Primary | ICD-10-CM

## 2024-11-29 DIAGNOSIS — F32.A DEPRESSION WITH SUICIDAL IDEATION: Primary | ICD-10-CM

## 2024-11-29 PROCEDURE — 99285 EMERGENCY DEPT VISIT HI MDM: CPT

## 2024-11-29 ASSESSMENT — PAIN - FUNCTIONAL ASSESSMENT: PAIN_FUNCTIONAL_ASSESSMENT: 0-10

## 2024-11-30 ENCOUNTER — HOSPITAL ENCOUNTER (INPATIENT)
Facility: HOSPITAL | Age: 24
LOS: 2 days | Discharge: HOME | DRG: 750 | End: 2024-12-02
Attending: PSYCHIATRY & NEUROLOGY | Admitting: PSYCHIATRY & NEUROLOGY
Payer: MEDICAID

## 2024-11-30 ENCOUNTER — APPOINTMENT (OUTPATIENT)
Facility: HOSPITAL | Age: 24
DRG: 750 | End: 2024-11-30
Attending: PSYCHIATRY & NEUROLOGY
Payer: MEDICAID

## 2024-11-30 ENCOUNTER — APPOINTMENT (OUTPATIENT)
Facility: HOSPITAL | Age: 24
End: 2024-11-30
Payer: MEDICAID

## 2024-11-30 VITALS
BODY MASS INDEX: 23.19 KG/M2 | SYSTOLIC BLOOD PRESSURE: 116 MMHG | RESPIRATION RATE: 18 BRPM | HEART RATE: 80 BPM | DIASTOLIC BLOOD PRESSURE: 57 MMHG | WEIGHT: 153 LBS | TEMPERATURE: 98 F | OXYGEN SATURATION: 95 % | HEIGHT: 68 IN

## 2024-11-30 DIAGNOSIS — R60.0 BILATERAL LEG EDEMA: Primary | ICD-10-CM

## 2024-11-30 LAB
-: NORMAL
ALBUMIN SERPL-MCNC: 4.5 G/DL (ref 3.5–5)
ALBUMIN/GLOB SERPL: 1.2 (ref 1.1–2.2)
ALP SERPL-CCNC: 64 U/L (ref 45–117)
ALT SERPL-CCNC: 68 U/L (ref 12–78)
AMPHET UR QL SCN: POSITIVE
ANION GAP SERPL CALC-SCNC: 12 MMOL/L (ref 2–12)
APAP SERPL-MCNC: <2 UG/ML (ref 10–30)
APPEARANCE UR: CLEAR
AST SERPL-CCNC: 155 U/L (ref 15–37)
BACTERIA URNS QL MICRO: NEGATIVE /HPF
BARBITURATES UR QL SCN: NEGATIVE
BASOPHILS # BLD: 0.3 K/UL (ref 0–0.1)
BASOPHILS NFR BLD: 2 % (ref 0–1)
BENZODIAZ UR QL: NEGATIVE
BILIRUB SERPL-MCNC: 1.3 MG/DL (ref 0.2–1)
BILIRUB UR QL: NEGATIVE
BUN SERPL-MCNC: 24 MG/DL (ref 6–20)
BUN/CREAT SERPL: 23 (ref 12–20)
CALCIUM SERPL-MCNC: 9.3 MG/DL (ref 8.5–10.1)
CANNABINOIDS UR QL SCN: POSITIVE
CHLORIDE SERPL-SCNC: 95 MMOL/L (ref 97–108)
CO2 SERPL-SCNC: 23 MMOL/L (ref 21–32)
COCAINE UR QL SCN: NEGATIVE
COLOR UR: ABNORMAL
COMMENT:: NORMAL
CREAT SERPL-MCNC: 1.03 MG/DL (ref 0.7–1.3)
DIFFERENTIAL METHOD BLD: ABNORMAL
EOSINOPHIL # BLD: 0.1 K/UL (ref 0–0.4)
EOSINOPHIL NFR BLD: 1 % (ref 0–7)
EPITH CASTS URNS QL MICRO: ABNORMAL /LPF
ERYTHROCYTE [DISTWIDTH] IN BLOOD BY AUTOMATED COUNT: 13.5 % (ref 11.5–14.5)
ETHANOL SERPL-MCNC: <10 MG/DL (ref 0–0.08)
GLOBULIN SER CALC-MCNC: 3.8 G/DL (ref 2–4)
GLUCOSE SERPL-MCNC: 100 MG/DL (ref 65–100)
GLUCOSE UR STRIP.AUTO-MCNC: NEGATIVE MG/DL
HAV IGM SER QL: NONREACTIVE
HBV CORE IGM SER QL: NONREACTIVE
HBV SURFACE AG SER QL: <0.1 INDEX
HBV SURFACE AG SER QL: NEGATIVE
HCT VFR BLD AUTO: 43.7 % (ref 36.6–50.3)
HCV AB SER IA-ACNC: 0.23 INDEX
HCV AB SERPL QL IA: NONREACTIVE
HGB BLD-MCNC: 15.5 G/DL (ref 12.1–17)
HGB UR QL STRIP: NEGATIVE
HIV1 P24 AG SERPL QL IA: NONREACTIVE
HIV1+2 AB SERPL QL IA: NONREACTIVE
HYALINE CASTS URNS QL MICRO: ABNORMAL /LPF (ref 0–2)
IMM GRANULOCYTES # BLD AUTO: 0 K/UL
IMM GRANULOCYTES NFR BLD AUTO: 0 %
KETONES UR QL STRIP.AUTO: 15 MG/DL
LEUKOCYTE ESTERASE UR QL STRIP.AUTO: NEGATIVE
LIPASE SERPL-CCNC: 16 U/L (ref 13–75)
LYMPHOCYTES # BLD: 3.6 K/UL (ref 0.8–3.5)
LYMPHOCYTES NFR BLD: 26 % (ref 12–49)
Lab: ABNORMAL
MAGNESIUM SERPL-MCNC: 2.3 MG/DL (ref 1.6–2.4)
MCH RBC QN AUTO: 30.6 PG (ref 26–34)
MCHC RBC AUTO-ENTMCNC: 35.5 G/DL (ref 30–36.5)
MCV RBC AUTO: 86.4 FL (ref 80–99)
METAMYELOCYTES NFR BLD MANUAL: 1 %
METHADONE UR QL: NEGATIVE
MONOCYTES # BLD: 1.4 K/UL (ref 0–1)
MONOCYTES NFR BLD: 10 % (ref 5–13)
NEUTS SEG # BLD: 8.3 K/UL (ref 1.8–8)
NEUTS SEG NFR BLD: 60 % (ref 32–75)
NITRITE UR QL STRIP.AUTO: NEGATIVE
NRBC # BLD: 0 K/UL (ref 0–0.01)
NRBC BLD-RTO: 0 PER 100 WBC
OPIATES UR QL: NEGATIVE
PCP UR QL: NEGATIVE
PH UR STRIP: 5.5 (ref 5–8)
PLATELET # BLD AUTO: 161 K/UL (ref 150–400)
PMV BLD AUTO: 13 FL (ref 8.9–12.9)
POTASSIUM SERPL-SCNC: 3.1 MMOL/L (ref 3.5–5.1)
PROT SERPL-MCNC: 8.3 G/DL (ref 6.4–8.2)
PROT UR STRIP-MCNC: ABNORMAL MG/DL
RBC # BLD AUTO: 5.06 M/UL (ref 4.1–5.7)
RBC #/AREA URNS HPF: ABNORMAL /HPF (ref 0–5)
RBC MORPH BLD: ABNORMAL
SALICYLATES SERPL-MCNC: 2.1 MG/DL (ref 2.8–20)
SODIUM SERPL-SCNC: 130 MMOL/L (ref 136–145)
SP GR UR REFRACTOMETRY: 1.02 (ref 1–1.03)
SPECIMEN HOLD: NORMAL
URINE CULTURE IF INDICATED: ABNORMAL
UROBILINOGEN UR QL STRIP.AUTO: 1 EU/DL (ref 0.2–1)
WBC # BLD AUTO: 13.8 K/UL (ref 4.1–11.1)
WBC URNS QL MICRO: ABNORMAL /HPF (ref 0–4)

## 2024-11-30 PROCEDURE — 73620 X-RAY EXAM OF FOOT: CPT

## 2024-11-30 PROCEDURE — 87040 BLOOD CULTURE FOR BACTERIA: CPT

## 2024-11-30 PROCEDURE — 6370000000 HC RX 637 (ALT 250 FOR IP): Performed by: NURSE PRACTITIONER

## 2024-11-30 PROCEDURE — 36415 COLL VENOUS BLD VENIPUNCTURE: CPT

## 2024-11-30 PROCEDURE — 80307 DRUG TEST PRSMV CHEM ANLYZR: CPT

## 2024-11-30 PROCEDURE — 6370000000 HC RX 637 (ALT 250 FOR IP): Performed by: PSYCHIATRY & NEUROLOGY

## 2024-11-30 PROCEDURE — 6370000000 HC RX 637 (ALT 250 FOR IP): Performed by: STUDENT IN AN ORGANIZED HEALTH CARE EDUCATION/TRAINING PROGRAM

## 2024-11-30 PROCEDURE — 85025 COMPLETE CBC W/AUTO DIFF WBC: CPT

## 2024-11-30 PROCEDURE — 86780 TREPONEMA PALLIDUM: CPT

## 2024-11-30 PROCEDURE — 81001 URINALYSIS AUTO W/SCOPE: CPT

## 2024-11-30 PROCEDURE — 96374 THER/PROPH/DIAG INJ IV PUSH: CPT

## 2024-11-30 PROCEDURE — 80143 DRUG ASSAY ACETAMINOPHEN: CPT

## 2024-11-30 PROCEDURE — 87389 HIV-1 AG W/HIV-1&-2 AB AG IA: CPT

## 2024-11-30 PROCEDURE — 82077 ASSAY SPEC XCP UR&BREATH IA: CPT

## 2024-11-30 PROCEDURE — 71260 CT THORAX DX C+: CPT

## 2024-11-30 PROCEDURE — 87661 TRICHOMONAS VAGINALIS AMPLIF: CPT

## 2024-11-30 PROCEDURE — 83690 ASSAY OF LIPASE: CPT

## 2024-11-30 PROCEDURE — 6360000002 HC RX W HCPCS: Performed by: STUDENT IN AN ORGANIZED HEALTH CARE EDUCATION/TRAINING PROGRAM

## 2024-11-30 PROCEDURE — 87491 CHLMYD TRACH DNA AMP PROBE: CPT

## 2024-11-30 PROCEDURE — 80179 DRUG ASSAY SALICYLATE: CPT

## 2024-11-30 PROCEDURE — 6360000004 HC RX CONTRAST MEDICATION: Performed by: STUDENT IN AN ORGANIZED HEALTH CARE EDUCATION/TRAINING PROGRAM

## 2024-11-30 PROCEDURE — 87563 M. GENITALIUM AMP PROBE: CPT

## 2024-11-30 PROCEDURE — 80053 COMPREHEN METABOLIC PANEL: CPT

## 2024-11-30 PROCEDURE — 93970 EXTREMITY STUDY: CPT

## 2024-11-30 PROCEDURE — 83735 ASSAY OF MAGNESIUM: CPT

## 2024-11-30 PROCEDURE — 87591 N.GONORRHOEAE DNA AMP PROB: CPT

## 2024-11-30 PROCEDURE — 80074 ACUTE HEPATITIS PANEL: CPT

## 2024-11-30 PROCEDURE — 1240000000 HC EMOTIONAL WELLNESS R&B

## 2024-11-30 RX ORDER — POTASSIUM CHLORIDE 750 MG/1
40 TABLET, EXTENDED RELEASE ORAL ONCE
Status: COMPLETED | OUTPATIENT
Start: 2024-11-30 | End: 2024-11-30

## 2024-11-30 RX ORDER — HYDROXYZINE HYDROCHLORIDE 50 MG/1
50 TABLET, FILM COATED ORAL 2 TIMES DAILY PRN
Status: ON HOLD | COMMUNITY
End: 2024-12-02 | Stop reason: HOSPADM

## 2024-11-30 RX ORDER — SENNOSIDES A AND B 8.6 MG/1
1 TABLET, FILM COATED ORAL DAILY PRN
Status: DISCONTINUED | OUTPATIENT
Start: 2024-11-30 | End: 2024-12-02 | Stop reason: HOSPADM

## 2024-11-30 RX ORDER — HALOPERIDOL 5 MG/ML
5 INJECTION INTRAMUSCULAR EVERY 4 HOURS PRN
Status: DISCONTINUED | OUTPATIENT
Start: 2024-11-30 | End: 2024-12-02 | Stop reason: HOSPADM

## 2024-11-30 RX ORDER — MAGNESIUM HYDROXIDE/ALUMINUM HYDROXICE/SIMETHICONE 120; 1200; 1200 MG/30ML; MG/30ML; MG/30ML
30 SUSPENSION ORAL EVERY 6 HOURS PRN
Status: DISCONTINUED | OUTPATIENT
Start: 2024-11-30 | End: 2024-12-02 | Stop reason: HOSPADM

## 2024-11-30 RX ORDER — HYDROXYZINE HYDROCHLORIDE 50 MG/1
50 TABLET, FILM COATED ORAL 3 TIMES DAILY PRN
Status: DISCONTINUED | OUTPATIENT
Start: 2024-11-30 | End: 2024-12-02 | Stop reason: HOSPADM

## 2024-11-30 RX ORDER — IOPAMIDOL 755 MG/ML
100 INJECTION, SOLUTION INTRAVASCULAR
Status: COMPLETED | OUTPATIENT
Start: 2024-11-30 | End: 2024-11-30

## 2024-11-30 RX ORDER — GINSENG 100 MG
CAPSULE ORAL 2 TIMES DAILY
Status: DISCONTINUED | OUTPATIENT
Start: 2024-11-30 | End: 2024-12-02 | Stop reason: HOSPADM

## 2024-11-30 RX ORDER — DIPHENHYDRAMINE HYDROCHLORIDE 50 MG/ML
50 INJECTION INTRAMUSCULAR; INTRAVENOUS EVERY 4 HOURS PRN
Status: DISCONTINUED | OUTPATIENT
Start: 2024-11-30 | End: 2024-12-02 | Stop reason: HOSPADM

## 2024-11-30 RX ORDER — ACETAMINOPHEN 325 MG/1
650 TABLET ORAL EVERY 4 HOURS PRN
Status: DISCONTINUED | OUTPATIENT
Start: 2024-11-30 | End: 2024-12-02 | Stop reason: HOSPADM

## 2024-11-30 RX ORDER — HALOPERIDOL 5 MG/1
5 TABLET ORAL EVERY 4 HOURS PRN
Status: DISCONTINUED | OUTPATIENT
Start: 2024-11-30 | End: 2024-12-02 | Stop reason: HOSPADM

## 2024-11-30 RX ORDER — LURASIDONE HYDROCHLORIDE 40 MG/1
40 TABLET, FILM COATED ORAL
Status: DISCONTINUED | OUTPATIENT
Start: 2024-11-30 | End: 2024-12-02 | Stop reason: HOSPADM

## 2024-11-30 RX ORDER — TRAZODONE HYDROCHLORIDE 50 MG/1
50 TABLET, FILM COATED ORAL NIGHTLY PRN
Status: DISCONTINUED | OUTPATIENT
Start: 2024-11-30 | End: 2024-12-02 | Stop reason: HOSPADM

## 2024-11-30 RX ORDER — ESCITALOPRAM OXALATE 10 MG/1
5 TABLET ORAL DAILY
Status: DISCONTINUED | OUTPATIENT
Start: 2024-11-30 | End: 2024-12-02

## 2024-11-30 RX ORDER — ONDANSETRON 4 MG/1
4 TABLET, ORALLY DISINTEGRATING ORAL EVERY 30 MIN PRN
Status: DISCONTINUED | OUTPATIENT
Start: 2024-11-30 | End: 2024-11-30 | Stop reason: HOSPADM

## 2024-11-30 RX ORDER — POLYETHYLENE GLYCOL 3350 17 G/17G
17 POWDER, FOR SOLUTION ORAL DAILY PRN
Status: DISCONTINUED | OUTPATIENT
Start: 2024-11-30 | End: 2024-12-02 | Stop reason: HOSPADM

## 2024-11-30 RX ORDER — POLYETHYLENE GLYCOL 3350 17 G
2 POWDER IN PACKET (EA) ORAL
Status: DISCONTINUED | OUTPATIENT
Start: 2024-11-30 | End: 2024-12-02 | Stop reason: HOSPADM

## 2024-11-30 RX ORDER — LORAZEPAM 2 MG/ML
2 INJECTION INTRAMUSCULAR ONCE
Status: COMPLETED | OUTPATIENT
Start: 2024-11-30 | End: 2024-11-30

## 2024-11-30 RX ADMIN — NICOTINE POLACRILEX 2 MG: 2 LOZENGE ORAL at 20:12

## 2024-11-30 RX ADMIN — LORAZEPAM 2 MG: 2 INJECTION INTRAMUSCULAR; INTRAVENOUS at 00:26

## 2024-11-30 RX ADMIN — TRAZODONE HYDROCHLORIDE 50 MG: 50 TABLET ORAL at 20:12

## 2024-11-30 RX ADMIN — POTASSIUM CHLORIDE 40 MEQ: 750 TABLET, EXTENDED RELEASE ORAL at 08:01

## 2024-11-30 RX ADMIN — IOPAMIDOL 100 ML: 755 INJECTION, SOLUTION INTRAVENOUS at 00:36

## 2024-11-30 RX ADMIN — LURASIDONE HYDROCHLORIDE 40 MG: 40 TABLET, FILM COATED ORAL at 19:00

## 2024-11-30 RX ADMIN — ESCITALOPRAM OXALATE 5 MG: 10 TABLET ORAL at 15:23

## 2024-11-30 RX ADMIN — BACITRACIN: 500 OINTMENT TOPICAL at 20:13

## 2024-11-30 ASSESSMENT — PAIN SCALES - GENERAL
PAINLEVEL_OUTOF10: 0
PAINLEVEL_OUTOF10: 0

## 2024-11-30 ASSESSMENT — LIFESTYLE VARIABLES
HOW MANY STANDARD DRINKS CONTAINING ALCOHOL DO YOU HAVE ON A TYPICAL DAY: 5 OR 6
HOW MANY STANDARD DRINKS CONTAINING ALCOHOL DO YOU HAVE ON A TYPICAL DAY: 1 OR 2
HOW OFTEN DO YOU HAVE A DRINK CONTAINING ALCOHOL: 4 OR MORE TIMES A WEEK
HOW OFTEN DO YOU HAVE A DRINK CONTAINING ALCOHOL: 2-3 TIMES A WEEK

## 2024-11-30 ASSESSMENT — PAIN - FUNCTIONAL ASSESSMENT: PAIN_FUNCTIONAL_ASSESSMENT: NONE - DENIES PAIN

## 2024-11-30 ASSESSMENT — SLEEP AND FATIGUE QUESTIONNAIRES
DO YOU HAVE DIFFICULTY SLEEPING: YES
SLEEP PATTERN: DISTURBED/INTERRUPTED SLEEP;INSOMNIA
DO YOU USE A SLEEP AID: YES
AVERAGE NUMBER OF SLEEP HOURS: 3

## 2024-11-30 NOTE — BSMART NOTE
Comprehensive Assessment Form Part 1      Section I - Disposition    Primary Diagnosis: Bipolar Disorder by hx  Secondary Diagnosis: Alcohol Use Disorder    The Medical Doctor to Psychiatrist conference was notcompleted.  The Medical Doctor is in agreement with Psychiatrist disposition because of (reason) .  The plan is .  The on-call Psychiatrist consulted was  .  The admitting Psychiatrist will be  .  The admitting Diagnosis is .  The Payor source is .  The name of the representative was .  This was     This writer reviewed the [place] Suicide Severity Rating Scale in nursing flowsheet and the risk level assigned is high risk.  Based on this assessment, the risk of suicide is low risk and the plan is.    Section II - Integrated Summary  Summary:  Triage: Patient arrives ambulatory to ED with complaints of needing medications managed   Patient is fidgeting in triage, reporting that he has not been able to manage his medications for Bipolar, Anxiety,Depression,ADHD, and requesting if he can be prescribed latuda. Patient advised we can not just change a prescription without and evaluation and he would need to be seen by provider and possible BSMART with potential admission, patient agreeable for evaluation .Patient continues to fidget and express concerns of self medicating over the 3 weeks with alcohol, marijuana, adderall, meth, and a sexual stimulating medication .Reports he was self medicating to keep his core temp up while he was sleeping outside .In the last week said he was vomiting blood related to alcohol use and drinking a bottle of cough syrup.    At bedside, per attending nurse patient was given Ativan in ED. This writer attempted to arouse patient by introducing herself and inquiring his name. Attending nurse and sitter in room assisted with arousing patient. When asked patient why he came to hospital he stated \"to get something to eat and drink\". Patient reported to this writer that he lives with  provider who reported patient did express to them suicidal thoughts and that he had a plan and that he engages in risky behaviors. ED provider expressed concern for patient safety if discharged and went to speak with patient about admission to [facility]. Dr. Grullon  informed this writer at 5:04am pt is willing to seeking voluntary admission.     5:06am: This writer spoke with patient and he expressed he is willing to come in for a voluntary admission to assist with mental health stablization.     Per chart review patient was assessed by ANICETO on 8/13  on assessment it was not that patient has had prior attempts that he reported that time but denied them to this writer today. Patient was admitted to  [facility] 8/13-8/16/2024.         The patient has demonstrated mental capacity to provide informed consent.  The information is given by the patient and past medical records.  The Chief Complaint is food and drink, treatment non compliance not on medications.  The Precipitant Factors are situational stressors, access to treatment, lack of support, substance use.  Previous Hospitalizations: yes  The patient has not previously been in restraints.  Current Psychiatrist and/or  is none reported.    Lethality Assessment:    The potential for suicide noted by the following: not noted at bedside during assessment.  The potential for homicide is not noted.  The patient has not been a perpetrator of sexual or physical abuse.  There are not pending charges.  The patient is not felt to be at risk for self harm or harm to others.  The attending nurse was advised  not noted .    Section III - Psychosocial  The patient's overall mood and attitude is fatigued, normal mood.  Feelings of helplessness and hopelessness are not observed.  Generalized anxiety is not observed.  Panic is not observed. Phobias are not observed.  Obsessive compulsive tendencies are not observed.      Section IV - Mental Status Exam  The patient's appearance

## 2024-11-30 NOTE — ED NOTES
Assumed care of the patient in room 7. Patient noted to be shaking, 1:1 sitter at bedside. Nutrition will be provided to patient. Patient defer to removing belongings at this time until he feels settled.

## 2024-11-30 NOTE — BSMART NOTE
Patient admitted by CHE Jarrett for Dr Mendoza to [facility]  732/01. Nurse report [phone]     Bed placement given to Nicole at [facility]

## 2024-11-30 NOTE — ED NOTES
Patient agreeable to continue eval in exam room, patient to exam room ,sitter to bedside    Patient provided with meal and water

## 2024-11-30 NOTE — ED NOTES
Assessment completed with BSMART, during the assessment pt was sleepy at first and then making jokes answering that he is pregnant and then would laugh etc

## 2024-11-30 NOTE — ED NOTES
Dr. Grullon has spoken with pt at bedside and pt has much different demeanor than when he was speaking with BSMART, no longer laughing and saying things not related to the questions; pt wishes to be a voluntary admission

## 2024-11-30 NOTE — BSMART NOTE
BSMART assessment completed, and suicide risk level noted to be low risk. Primary Nurse Yoselyn and Charge Nurse Michelle and Physician Yadi notified. Concerns not observed.    Patient had 1:1 at bedside, prior statements.
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
## 2024-11-30 NOTE — ED NOTES
Assumed care of pt at this time, bedside shift change report received from DANILO Mcguire to include sbar, plan of care, FELIXWA, on full CM, asleep, vss, sitter 1:1, pt belongings bag x 1 and backpack in green cart, pt has his socks and shoes on and sweat pants, cell phone on the bed, vape put in the green cart. Pts brother just visited a few minutes ago but left since pt is asleep  Pt in green gown

## 2024-11-30 NOTE — BH NOTE
CHE Jarrett did not order a 1:1 for this patient based on the information provided by the ED and BSMART. Should the patient's presentation change the on call should be consulted about further 1:1 needs.

## 2024-11-30 NOTE — ED TRIAGE NOTES
Patient arrives ambulatory to ED with complaints of needing medications managed     Patient is fidgeting in triage, reporting that he has not been able to manage his medications for Bipolar, Anxiety,Depression,ADHD, and requesting if he can be prescribed latuda    Patient advised we can not just change a prescription without and evaluation and he would need to be seen by provider and possible BSMART with potential admission, patient agreeable for evaluation     Patient continues to fidget and express concerns of self medicating over the 3 weeks with alcohol, marijuana, adderall, meth, and a sexual stimulating medication     Reports he was self medicating to keep his core temp up while he was sleeping outside     In the last week said he was vomiting blood related to alcohol use and drinking a bottle of cough syrup

## 2024-11-30 NOTE — PLAN OF CARE
[facility]  Admission Note     Admission Type:   Admission Type: Voluntary    Reason for admission:   Patient has not been on medication for apx one month, bizarre and incongruent; family wanted patient to seek help due to being concerned with his behavior, having risky behaviors, including sexual and polysubstance use.     PATIENT STRENGTHS:       Patient Strengths and Limitations:   Patient has supportive family and support system. Patient limitations include; history of polysubstance use, homeless and engaging in risky sexual behaviors        Addictive Behavior:    Risky sexual behaviors, polysubstance use, tobacco use     Medical Problems:   Past Medical History:   Diagnosis Date    ADHD     Anxiety     Bipolar 1 disorder (HCC)     Dental disorder     Depression     anxiety and depression    Headache     History of pseudoseizure        Status EXAM:  Mental Status and Behavioral Exam  Normal: No  Level of Assistance: Independent/Self  Facial Expression: Flat  Affect: Congruent  Level of Consciousness: Alert  Frequency of Checks: 4 times per hour, close  Mood:Normal: No  Mood: Anxious, Sad, Helpless  Motor Activity:Normal: Yes  Motor Activity: Increased  Eye Contact: Good  Observed Behavior: Preoccupied, Cooperative  Sexual Misconduct History: Current - no  Preception: [place] to person, [place] to time, [place] to place, [place] to situation  Attention:Normal: No  Attention: Unable to concentrate  Thought Processes: Circumstantial  Thought Content:Normal: No  Thought Content: Preoccupations  Depression Symptoms: Appetite change, Feelings of helplessness, Feelings of hopelessess, Feelings of worthlessness, Impaired concentration  Anxiety Symptoms: Generalized  Susana Symptoms: Poor judgment  Hallucinations: None  Delusions: No  Memory:Normal: Yes  Insight and Judgment: No  Insight and Judgment: Poor judgment, Poor insight            Danni Nielsen RN         Problem: Safety - Adult  Goal: Free from fall

## 2024-11-30 NOTE — BH NOTE
0802- TRANSFER - IN REPORT:    Verbal report received from Katie Marino RN on Moises Zurita .  being received from [facility] ED for routine progression of patient care      Report consisted of patient's Situation, Background, Assessment and   Recommendations(SBAR).     Information from the following report(s) ED SBAR was reviewed with the receiving nurse.    Opportunity for questions and clarification was provided.      Assessment completed upon patient's arrival to unit and care assumed.      Per report, pt is coming voluntary.

## 2024-12-01 VITALS
BODY MASS INDEX: 22.84 KG/M2 | DIASTOLIC BLOOD PRESSURE: 76 MMHG | HEART RATE: 69 BPM | SYSTOLIC BLOOD PRESSURE: 132 MMHG | HEIGHT: 69 IN | RESPIRATION RATE: 16 BRPM | TEMPERATURE: 98 F | WEIGHT: 154.2 LBS | OXYGEN SATURATION: 100 %

## 2024-12-01 LAB
ALBUMIN SERPL-MCNC: 3.4 G/DL (ref 3.5–5)
ALBUMIN/GLOB SERPL: 1 (ref 1.1–2.2)
ALP SERPL-CCNC: 62 U/L (ref 45–117)
ALT SERPL-CCNC: 42 U/L (ref 12–78)
ANION GAP SERPL CALC-SCNC: 6 MMOL/L (ref 2–12)
AST SERPL-CCNC: 51 U/L (ref 15–37)
BASOPHILS # BLD: 0 K/UL (ref 0–0.1)
BASOPHILS NFR BLD: 1 % (ref 0–1)
BILIRUB SERPL-MCNC: 0.3 MG/DL (ref 0.2–1)
BUN SERPL-MCNC: 15 MG/DL (ref 6–20)
BUN/CREAT SERPL: 17 (ref 12–20)
CALCIUM SERPL-MCNC: 8.7 MG/DL (ref 8.5–10.1)
CHLORIDE SERPL-SCNC: 105 MMOL/L (ref 97–108)
CHOLEST SERPL-MCNC: 150 MG/DL
CO2 SERPL-SCNC: 28 MMOL/L (ref 21–32)
CREAT SERPL-MCNC: 0.87 MG/DL (ref 0.7–1.3)
DIFFERENTIAL METHOD BLD: ABNORMAL
EKG ATRIAL RATE: 68 BPM
EKG DIAGNOSIS: NORMAL
EKG P AXIS: 75 DEGREES
EKG P-R INTERVAL: 138 MS
EKG Q-T INTERVAL: 404 MS
EKG QRS DURATION: 82 MS
EKG QTC CALCULATION (BAZETT): 429 MS
EKG R AXIS: 41 DEGREES
EKG T AXIS: 69 DEGREES
EKG VENTRICULAR RATE: 68 BPM
EOSINOPHIL # BLD: 0.2 K/UL (ref 0–0.4)
EOSINOPHIL NFR BLD: 4 % (ref 0–7)
ERYTHROCYTE [DISTWIDTH] IN BLOOD BY AUTOMATED COUNT: 13.6 % (ref 11.5–14.5)
EST. AVERAGE GLUCOSE BLD GHB EST-MCNC: 103 MG/DL
GLOBULIN SER CALC-MCNC: 3.5 G/DL (ref 2–4)
GLUCOSE SERPL-MCNC: 122 MG/DL (ref 65–100)
HBA1C MFR BLD: 5.2 % (ref 4–5.6)
HCT VFR BLD AUTO: 39.1 % (ref 36.6–50.3)
HDLC SERPL-MCNC: 85 MG/DL
HDLC SERPL: 1.8 (ref 0–5)
HGB BLD-MCNC: 13.2 G/DL (ref 12.1–17)
IMM GRANULOCYTES # BLD AUTO: 0 K/UL (ref 0–0.04)
IMM GRANULOCYTES NFR BLD AUTO: 0 % (ref 0–0.5)
LDLC SERPL CALC-MCNC: 50.4 MG/DL (ref 0–100)
LYMPHOCYTES # BLD: 2.9 K/UL (ref 0.8–3.5)
LYMPHOCYTES NFR BLD: 44 % (ref 12–49)
MCH RBC QN AUTO: 30 PG (ref 26–34)
MCHC RBC AUTO-ENTMCNC: 33.8 G/DL (ref 30–36.5)
MCV RBC AUTO: 88.9 FL (ref 80–99)
MONOCYTES # BLD: 1 K/UL (ref 0–1)
MONOCYTES NFR BLD: 14 % (ref 5–13)
NEUTS SEG # BLD: 2.5 K/UL (ref 1.8–8)
NEUTS SEG NFR BLD: 37 % (ref 32–75)
NRBC # BLD: 0 K/UL (ref 0–0.01)
NRBC BLD-RTO: 0 PER 100 WBC
PLATELET # BLD AUTO: 114 K/UL (ref 150–400)
POTASSIUM SERPL-SCNC: 3.5 MMOL/L (ref 3.5–5.1)
PROT SERPL-MCNC: 6.9 G/DL (ref 6.4–8.2)
RBC # BLD AUTO: 4.4 M/UL (ref 4.1–5.7)
SODIUM SERPL-SCNC: 139 MMOL/L (ref 136–145)
TRIGL SERPL-MCNC: 73 MG/DL
VLDLC SERPL CALC-MCNC: 14.6 MG/DL
WBC # BLD AUTO: 6.6 K/UL (ref 4.1–11.1)

## 2024-12-01 PROCEDURE — 85025 COMPLETE CBC W/AUTO DIFF WBC: CPT

## 2024-12-01 PROCEDURE — 80053 COMPREHEN METABOLIC PANEL: CPT

## 2024-12-01 PROCEDURE — 1240000000 HC EMOTIONAL WELLNESS R&B

## 2024-12-01 PROCEDURE — 93005 ELECTROCARDIOGRAM TRACING: CPT | Performed by: NURSE PRACTITIONER

## 2024-12-01 PROCEDURE — 80061 LIPID PANEL: CPT

## 2024-12-01 PROCEDURE — 6370000000 HC RX 637 (ALT 250 FOR IP): Performed by: PSYCHIATRY & NEUROLOGY

## 2024-12-01 PROCEDURE — 6370000000 HC RX 637 (ALT 250 FOR IP): Performed by: NURSE PRACTITIONER

## 2024-12-01 PROCEDURE — 93010 ELECTROCARDIOGRAM REPORT: CPT | Performed by: INTERNAL MEDICINE

## 2024-12-01 PROCEDURE — 36415 COLL VENOUS BLD VENIPUNCTURE: CPT

## 2024-12-01 PROCEDURE — 83036 HEMOGLOBIN GLYCOSYLATED A1C: CPT

## 2024-12-01 RX ADMIN — HYDROXYZINE HYDROCHLORIDE 50 MG: 50 TABLET, FILM COATED ORAL at 20:18

## 2024-12-01 RX ADMIN — ESCITALOPRAM OXALATE 5 MG: 10 TABLET ORAL at 09:07

## 2024-12-01 RX ADMIN — TRAZODONE HYDROCHLORIDE 50 MG: 50 TABLET ORAL at 20:18

## 2024-12-01 RX ADMIN — ACETAMINOPHEN 650 MG: 325 TABLET ORAL at 18:27

## 2024-12-01 RX ADMIN — NICOTINE POLACRILEX 2 MG: 2 LOZENGE ORAL at 12:13

## 2024-12-01 RX ADMIN — BACITRACIN: 500 OINTMENT TOPICAL at 20:19

## 2024-12-01 RX ADMIN — LURASIDONE HYDROCHLORIDE 40 MG: 40 TABLET, FILM COATED ORAL at 18:43

## 2024-12-01 ASSESSMENT — PAIN DESCRIPTION - LOCATION
LOCATION: HEAD
LOCATION: GENERALIZED

## 2024-12-01 ASSESSMENT — PAIN - FUNCTIONAL ASSESSMENT
PAIN_FUNCTIONAL_ASSESSMENT: NONE - DENIES PAIN
PAIN_FUNCTIONAL_ASSESSMENT: ACTIVITIES ARE NOT PREVENTED

## 2024-12-01 ASSESSMENT — PAIN SCALES - GENERAL: PAINLEVEL_OUTOF10: 5

## 2024-12-01 NOTE — BH NOTE
PSYCHIATRIC PROGRESS NOTE    Chief Complaint:  I feel okay today.     Length of Stay: 1 Days    Interval History:  Moises reports feeling better today. States that he still feels \"spacey\" at times and nursing staff note that he remains paranoid. He expressed delusions about being \"followed by cameras and such like\" although the delusion is less intensely held today. He has been visible in the milieu, social with peers and pleasant in his interactions. Denies any adverse events from taking Latuda. He requests to be discharged today but agrees to stay until tomorrow to meet with the primary team. Compliant with taking his medications.       Past Medical History:  Past Medical History:   Diagnosis Date    ADHD     Anxiety     Bipolar 1 disorder (HCC)     Dental disorder     Depression     anxiety and depression    Headache     History of pseudoseizure           bacitracin   Topical BID    lurasidone  40 mg Oral Dinner    escitalopram  5 mg Oral Daily       Labs:  Lab Results   Component Value Date/Time    WBC 6.6 12/01/2024 05:17 AM    HGB 13.2 12/01/2024 05:17 AM    HCT 39.1 12/01/2024 05:17 AM     12/01/2024 05:17 AM    MCV 88.9 12/01/2024 05:17 AM      Lab Results   Component Value Date/Time     12/01/2024 05:17 AM    K 3.5 12/01/2024 05:17 AM     12/01/2024 05:17 AM    CO2 28 12/01/2024 05:17 AM    BUN 15 12/01/2024 05:17 AM    GLOB 3.5 12/01/2024 05:17 AM    ALT 42 12/01/2024 05:17 AM          Vitals:    12/01/24 1115   BP: 126/76   Pulse: 70   Resp: 16   Temp: 98 °F (36.7 °C)   SpO2: 100%        Physical Exam:  Body habitus: Body mass index is 22.77 kg/m².  Musculoskeletal system: normal gait  Tremor - neg  Cog wheeling - neg    Mental Status Exam:  M is a 24 y.o. YO male   Patient maintains eye contact throughout the evaluation  Psychomotor activity: WNL  Speech is spontaneous, with NL volume and prosody  Thought process is   Mood is reported as \"okay\" Affect is congruent,   Thought content

## 2024-12-01 NOTE — PROGRESS NOTES
[facility]                                                                                          Hospitalist Progress Note  MILLIE Acosta - CNP  Office Phone: [phone]        Date of Service:  2024  NAME:  Moises Zurita Jr.  :  2000  MRN:  [id number]       Admission Summary:   Moises Zurita Jr. is a 24 y.o. male with pmhx of illicit drug use, risky sexual behaviors and tobacco use who presents with bizarre and incongruent behavior.  Patient admitted to behavioral health from ED at [facility].  Patient attests to recent homelessness, illicit drug use, risky sexual behavior and lots of walking on barefeet.  Attests to having productive cough one month, intermittent fever, pain in feet and back of knees with abrasions and blisters.  Hospitalist consulted for medical evaluation        Interval history / Subjective:   Seen resting in bed in NAD.  Discussed lab results so far.  He wants to be discharged.      Assessment & Plan:     Acute Psychosis  -managed by primary team     Risky sexual behavior  Polysubstance abuse  -HIV, Hep B panel are both negative  -syphillis, gonorhea, Chlamydia : still pending  -leukocytosis likely reactive as it normalized  -blood culture x 2: NG x 24 hours   -UA done at [facility] with trace proteins and 15 ketones  -UDS +THC and amphetamine  -CT of chest at [facility]  No acute findings in the chest, abdomen, or pelvis. No lymphadenopathy.     LLE edema  -multiple wounds and blisters on right foot  -tenderness behind calves and knees  -duplex BLE with no evidence of thrombus  -xray alisa feet with AVN and foreign body in L foot: podiatry consulted   -may shower when walking steady  -bacitracin ointment to Alisa Feet/abrasion x 3 days     Hypokalemia  -K 3.5  after repletion        Code status: full  Prophylaxis: ambulatory   Care Plan discussed with: patient, attending   Anticipated Disposition: per primary team

## 2024-12-01 NOTE — PLAN OF CARE
Problem: Safety - Adult  Goal: Free from fall injury  12/1/2024 0018 by Luisana Harding LPN  Outcome: Progressing   Pt laying in bed with eyes closed, appears to be sleeping. Respirations even and unlabored, NAD. Safety measures in place. Q15 min safety checks continued.

## 2024-12-01 NOTE — PLAN OF CARE
Problem: Safety - Adult  Goal: Free from fall injury  12/1/2024 0747 by Kristen Ross RN  Outcome: Progressing  12/1/2024 0018 by Luisana Harding LPN  Outcome: Progressing     Problem: Self Harm/Suicidality  Goal: Will have no self-injury during hospital stay  Description: INTERVENTIONS:  1.  Ensure constant observer at bedside with Q15M safety checks  2.  Maintain a safe environment  3.  Secure patient belongings  4.  Ensure family/visitors adhere to safety recommendations  5.  Ensure safety tray has been added to patient's diet order  6.  Every shift and PRN: Re-assess suicidal risk via Frequent Screener    Outcome: Progressing

## 2024-12-02 PROBLEM — F31.9 BIPOLAR DISORDER (HCC): Status: ACTIVE | Noted: 2024-12-02

## 2024-12-02 LAB
C TRACH DNA SPEC QL NAA+PROBE: NEGATIVE
COMMENT, TMCMT: NORMAL
ECHO BSA: 1.91 M2
MYCOPLASMA GENITALIUM, AMPLIFIED: NEGATIVE
N GONORRHOEA DNA SPEC QL NAA+PROBE: NEGATIVE
SAMPLE TYPE: NORMAL
SAMPLE TYPE:,SAMPT: NORMAL
SERVICE CMNT-IMP: NORMAL
SPECIMEN SOURCE: NORMAL
SPECIMEN SOURCE: NORMAL
T PALLIDUM AB SER QL IA: NON REACTIVE
T. VAGINALIS AMPLIFIED: NEGATIVE

## 2024-12-02 PROCEDURE — 6370000000 HC RX 637 (ALT 250 FOR IP): Performed by: PSYCHIATRY & NEUROLOGY

## 2024-12-02 RX ORDER — LURASIDONE HYDROCHLORIDE 40 MG/1
40 TABLET, FILM COATED ORAL
Qty: 30 TABLET | Refills: 0 | Status: SHIPPED | OUTPATIENT
Start: 2024-12-02 | End: 2025-01-01

## 2024-12-02 RX ORDER — ESCITALOPRAM OXALATE 10 MG/1
10 TABLET ORAL DAILY
Status: DISCONTINUED | OUTPATIENT
Start: 2024-12-03 | End: 2024-12-02 | Stop reason: HOSPADM

## 2024-12-02 RX ORDER — HYDROXYZINE HYDROCHLORIDE 50 MG/1
50 TABLET, FILM COATED ORAL 2 TIMES DAILY PRN
Qty: 60 TABLET | Refills: 0 | Status: SHIPPED | OUTPATIENT
Start: 2024-12-02 | End: 2025-01-01

## 2024-12-02 RX ORDER — ESCITALOPRAM OXALATE 10 MG/1
10 TABLET ORAL DAILY
Qty: 30 TABLET | Refills: 0 | Status: SHIPPED | OUTPATIENT
Start: 2024-12-02 | End: 2025-01-01

## 2024-12-02 RX ADMIN — ESCITALOPRAM OXALATE 5 MG: 10 TABLET ORAL at 12:08

## 2024-12-02 ASSESSMENT — PAIN DESCRIPTION - LOCATION: LOCATION: GENERALIZED

## 2024-12-02 NOTE — CARE COORDINATION
12/02/24 1517   Suicidal Ideation   Wish to be Dead Lifetime - No;Past 1 month - No   Non-Specific Active Suicidal Thoughts Lifetime - No;Past 1 month - Yes   Suicidal Behavior Trigger Non-Specific Active Suicidal Thoughts   Active Suicidal Ideation with Any Methods (Not Plan) without Intent to Act Lifetime - No;Past 1 month - Yes   Active Suicidal Ideation with Some Intent to Act, without Specific Plan Lifetime - No;Past 1 month - No   Active Suicidal Ideation with Specific Plan and Intent Lifetime- No;Past 1 month - No   Intensity of Ideation   Lifetime - Most Severe Ideation 1 - least severe   Lifetime - Most Recent Ideation 1 - least severe   Frequency Once a week   Duration Less than 1 hour/some of the time   Controllability Can control thoughts with little difficulty   Deterrents Does not apply   Reasons for Ideation Does not apply   Suicidal Behavior   Actual Attempt Lifetime - No;Past 3 months - No   Total # of Attempts 0   Has subject engaged in Non-Suicidal Self-Injurious Behavior? Lifetime - No;Past 3 months - No   Interrupted Attempt Lifetime - No;Past 3 months - No   Total # of interrupted 0   Aborted or Self-Interrupted Attempt Lifetime - No;Past 3 months - No   Total # of aborted or self-interrupted 0   Preparatory Acts or Behavior Lifetime - No;Past 3 months - No   Total # of Preparatory Acts 0   Actual Lethality/Medical Damage 0   Potential Lethality 0

## 2024-12-02 NOTE — INTERDISCIPLINARY ROUNDS
Behavioral Health Interdisciplinary Rounds     Patient Name: Moises QUEEN Joannleslee Binh  Age: 24 y.o.  Room/Bed:  [facility]/  Primary Diagnosis: <principal problem not specified>   Admission Status: Voluntary    Readmission within 30 days: No  Power of  in place: No  Patient requires a blocked bed: Yes          Reason for blocked bed: Sexually inappropriate   Sleep hours: 7       Participation in Care/Groups:  Yes  Medication Compliant?: Yes  PRNS (last 24 hours): Antianxiety and Sleep Aid   Restraints (last 24 hours):  No  __________________________________________________  OQ Admission Analysis Survey completed:yes   OQ Admission Analysis Survey score:78  __________________________________________________     Alcohol screening (AUDIT) completed -  yes   Score - 4    Tobacco -   yes   Illegal Drugs use:  yes  CSSRS Lifetime - completed         24 hour chart check complete: Yes    _______________________________________________    Patient goal(s) for today: discharge   Treatment team focus/goals: plan for discharge   Progress note: He denies SI, denies any issues with his medications and is requesting discharge today      Spiritual Care Consult:   Financial concerns/prescription coverage:  [facility] Medicaid   Family contact:                        Family requesting physician contact today:    Discharge plan: He reports he will be staying with family   Access to weapons : no                                                             Outpatient provider(s): refer to the [facility] for outpatient treatment   LOS:  2  Expected LOS: today     Participating treatment team members: Moises Zurita Jr., PAOLA [address] RN

## 2024-12-02 NOTE — BH NOTE
GROUP THERAPY PROGRESS NOTE    Patient did not participate in Psychoeducation group.    Katherine Gillies, MSW, [facility]

## 2024-12-02 NOTE — DISCHARGE INSTRUCTIONS
staff.  After your information has been reviewed you will meet with [facility] staff who will complete a Same Day Access Assessment, where they will ask the individual about symptoms and history.  The [facility] staff will type the information into an electronic health record.  The electronic health record includes the individual’s personal information and will not be shared without your permission. More information about federal confidentiality and where exceptions are made for safety and court ordered involvement will be discussed further with a clinician.  Staff will talk with you about whether you or the individual you’re helping to get services appear to be eligible for [facility] services, or whether there might be another resource in the community that would be more helpful and appropriate.  If you or the individual seeking services do not appear to meet [facility] admission criteria, you will be given a list of other service providers and resources in the community. If you are unable to get help from these listed providers, please contact [facility] [phone] ([place]) or [phone] ([place]) and we will try to help you find other resources.  After the assessment, the individual will receive an explanation of diagnosis and treatment recommendations which could include various services, depending on the level of care you require.  If you decide to pursue the [facility] services recommended, the clinician will explain the next steps on starting your journey toward healing and recovery, as well as provide the individual with an initial service plan (ISP).  You will leave the appointment with either referral for other community services or your next appointment with [facility] primary provider.  In a Behavioral Health Emergency  In a life-threatening emergency, dial 911.  If you are experiencing a Mental Health crisis, [facility] Emergency Services is available 24/7 at [phone] or [phone]. Monday through Friday from 8am- 4:30pm, we are

## 2024-12-02 NOTE — PROGRESS NOTES
Behavioral Services  Medicare Certification Upon Admission    I certify that this patient's inpatient psychiatric hospital admission is medically necessary for:    [x] (1) Treatment which could reasonably be expected to improve this patient's condition,       [] (2) Or for diagnostic study;     AND     [x](2) The inpatient psychiatric services are provided while the individual is under the care of a physician and are included in the individualized plan of care.    Estimated length of stay/service 3-5 days.    Plan for post-hospital care Outpatient Psychiatry.    Electronically signed by Jaun Turner MD on 12/2/2024 at 12:49 PM

## 2024-12-02 NOTE — PROGRESS NOTES
Pt discharged by primary team    XR of Right/Left foot: AVN of the right and left second metatarsal heads. Foreign material noted along the plantar aspect of the left third metatarsal head with shortening of the structure.    Discussed with DANILO Peterson, not clear if podiatry consult was called yesterday as ordered by hospitalist.  DANILO Peterson will discuss with CM, pt needs to be called to come back and be evaluated by podiatry.    Charmaine Worley, -[facility]     none known

## 2024-12-02 NOTE — DISCHARGE SUMMARY
DISCHARGE SUMMARY    Some parts of the discharge summary are from the initial Psychiatric interview that was done on admission by the admitting psychiatrist.      Date of Admission: 11/30/2024    Date of Discharge:12/2/2024     TYPE OF DISCHARGE:   REGULAR -  YES    ADMISSION EVALUATION:  Moises reports feeling better today. States that he still feels \"spacey\" at times and nursing staff note that he remains paranoid. He expressed delusions about being \"followed by cameras and such like\" although the delusion is less intensely held today. He has been visible in the milieu, social with peers and pleasant in his interactions. Denies any adverse events from taking Latuda. He requests to be discharged today but agrees to stay until tomorrow to meet with the primary team. Compliant with taking his medications.     Course in the Hospital:   Patient was admitted to the inpatient psychiatry unit for acute psychiatric stabilization in regards to symptomatology as described in the HPI above and placed on Q15 minute checks and withdrawal precautions.     Patient was started on lexapro and latuda and did well.    DISCHARGE DIAGNOSIS:  Schizoaffective Disorder, Bipolar type        Medication List        START taking these medications      lurasidone 40 MG Tabs tablet  Commonly known as: LATUDA  Take 1 tablet by mouth Daily with supper            CHANGE how you take these medications      hydrOXYzine HCl 50 MG tablet  Commonly known as: ATARAX  Take 1 tablet by mouth 2 times daily as needed for Anxiety  What changed: reasons to take this            CONTINUE taking these medications      escitalopram 10 MG tablet  Commonly known as: LEXAPRO  Take 1 tablet by mouth daily            STOP taking these medications      nicotine 21 MG/24HR  Commonly known as: NICODERM CQ     prazosin 2 MG capsule  Commonly known as: MINIPRESS     traZODone 50 MG tablet  Commonly known as: DESYREL               Where to Get Your Medications

## 2024-12-02 NOTE — CARE COORDINATION
12/02/24 0844   ITP   Date of Plan 12/02/24   Date of Next Review 12/09/24   Primary Diagnosis Code Depression   Barriers to Treatment Need for psychoeducation   Strengths Incorporated in Plan Acknowledging need for assistance;Seeking interactions;Verbal   Short Term Goal 1   Short Term Goal 1 Client will learn and demonstrate effective coping skills   Baseline Functioning Client experiences SI   Target Client will be able to manage negative feelings so as to not have thoughts of harming self.   Objectives Client will participate in group therapy   Intervention 1 Acknowledge client strengths;Monitor medications   Frequency daily   Measured by Behavioral data;Self report;Staff observation   Staff Responsible [facility] staff;Clinical staff   Intervention 2 Group therapy   Frequency daily   Measured by Behavioral data;Self report;Staff observation   Staff Responsible [facility] staff;Clinical staff   Intervention 3 Milieu therapy and support   Frequency daily   Measured by Behavioral data;Self report;Staff observation   Staff Responsible [facility] staff;Clinical staff   Short Term Goal 2   Short Term Goal 2 Client will maintain compliance with medication regime   Baseline Functioning Medications not helping   Target take medications as prescribed on a daily basis   Objectives Other (comment)  (medication compliance)   Intervention 1 Monitor medications   Frequency daily   Measured by Behavioral data;Self report;Staff observation   Staff Responsible Clinical staff   Crisis/Safety/Discharge Plan   Crisis/Safety Plan Standard program interventions and protocol   Comprehensive Assessment Completion Date 12/02/24   Discharge Plan He will return home with  follow up

## 2024-12-02 NOTE — BH NOTE
1630 Unsuccessfully attempted to contact pt regarding the findings w/ respect to his feet. See Note from Charmaine VICENTE (12/02). PT is homeless and appears to not have a direct contact number on file. Physician, , Manager notified of situation

## 2024-12-02 NOTE — PLAN OF CARE
PT is calm and cooperative on unit.  Med/Meal compliant.  Denies S/I, H/I, A/H and V/H.  Mood/affect: brightened/congruent.  Visible on unit, engaged w/ peers and staff.    Problem: Safety - Adult  Goal: Free from fall injury  12/2/2024 1052 by Kristen Ross, RN  Outcome: Progressing  12/1/2024 2353 by Amena Costello, RN  Outcome: Progressing     Problem: Self Harm/Suicidality  Goal: Will have no self-injury during hospital stay  Description: INTERVENTIONS:  1.  Ensure constant observer at bedside with Q15M safety checks  2.  Maintain a safe environment  3.  Secure patient belongings  4.  Ensure family/visitors adhere to safety recommendations  5.  Ensure safety tray has been added to patient's diet order  6.  Every shift and PRN: Re-assess suicidal risk via Frequent Screener    Outcome: Progressing     Problem: Depression  Goal: Will be euthymic at discharge  Description: INTERVENTIONS:  1. Administer medication as ordered  2. Provide emotional support via 1:1 interaction with staff  3. Encourage involvement in milieu/groups/activities  4. Monitor for social isolation  Outcome: Progressing

## 2024-12-02 NOTE — BH NOTE
GROUP THERAPY PROGRESS NOTE    Patient did not participate in recreational therapy group.    Katherine Gillies, MSW, [facility]

## 2024-12-02 NOTE — BH NOTE
GROUP THERAPY PROGRESS NOTE    Patient is participating in Self-care group.    Group time: 30 minutes    Personal goal for participation: To gain an understanding of the importance of self-awareness.    Goal orientation: Personal    Group therapy participation: passive     Therapeutic interventions reviewed and discussed:  Group members were guided through developing an understanding of how self-awareness contributes to our ability to cope with life stressors. Members given the opportunity to complete self-awareness assessment. VIDEO. Handouts provided    Impression of participation: SW provided pts with handouts to complete independently due to low participation/engagement     Katherine Gillies, MSW, [facility]

## 2024-12-02 NOTE — BH NOTE
Behavioral Health Transition Record    Patient Name: Moises Zurita Jr.  YOB: 2000   Medical Record Number: [id number]  Date of Admission: 11/30/2024  9:23 AM   Date of Discharge: 12/02/ 24    Attending Provider: Felisha Mendoza MD   Discharging Provider:    To contact this individual call [phone]  and ask the  to page.  If unavailable, ask to be transferred to Behavioral Health Provider on call.  A Behavioral Health Provider will be available on call 24/7 and during holidays.    Primary Care Provider: Kate Hernandez MD    Allergies   Allergen Reactions    Bee Venom Swelling    Methylprednisolone Hives       Reason for Admission:   Date of Admission: 11/30/2024    Date of Discharge:12/2/2024      TYPE OF DISCHARGE:   REGULAR -  YES     ADMISSION EVALUATION:  Moises reports feeling better today. States that he still feels \"spacey\" at times and nursing staff note that he remains paranoid. He expressed delusions about being \"followed by cameras and such like\" although the delusion is less intensely held today. He has been visible in the milieu, social with peers and pleasant in his interactions. Denies any adverse events from taking Latuda. He requests to be discharged today but agrees to stay until tomorrow to meet with the primary team. Compliant with taking his medications.      Course in the Hospital:   Patient was admitted to the inpatient psychiatry unit for acute psychiatric stabilization in regards to symptomatology as described in the HPI above and placed on Q15 minute checks and withdrawal precautions.      Patient was started on lexapro and latuda and did well.    Admission Diagnosis: Bipolar disorder (HCC) [F31.9]    * No surgery found *    Results for orders placed or performed during the hospital encounter of 11/30/24   Culture, Blood 2    Specimen: Blood   Result Value Ref Range    Special Requests LEFT  Antecubital        Culture NO GROWTH 1 DAY     HIV1/2

## 2024-12-02 NOTE — BH NOTE
PSYCHOSOCIAL ASSESSMENT  :Patient identifying info:   Moises Zurita Jr. is a 24 y.o., male admitted 2024  9:23 AM     Presenting problem and precipitating factors: Pt arrived to ED due to inability to manage medications for anxiety, depression, ADHD. Pt reports self medicating with THC, Meth, Adderall. Pt denies SI, HI, AHVH.     Mental status assessment:     Strengths/Recreation/Coping Skills:    Collateral information:     Current psychiatric /substance abuse providers and contact info: no current psych provider    Previous psychiatric/substance abuse providers and response to treatment: Pt reports previous admission [facility] 2024    Family history of mental illness or substance abuse: unknown     Substance abuse history:  ETOH, THC, methamphetamine, adderall  Social History     Tobacco Use    Smoking status: Never    Smokeless tobacco: Current   Substance Use Topics    Alcohol use: No       History of biomedical complications associated with substance abuse: none    Patient's current acceptance of treatment or motivation for change: voluntary     Family constellation: single, no children     Is significant other involved? No     Describe support system: Pt denies having support     Describe living arrangements and home environment: Pt lives with grandmother     GUARDIAN/POA: No    Guardian Name:     Guardian Contact:     Health issues:     Trauma history: yes    Legal issues: no    History of  service: no     Financial status:  Unemployed    Orthodox/cultural factors: not reported     Education/work history: not assessed     Have you been licensed as a health care professional (current or ): no     Describe coping skills: Ineffectual     Katherine Gillies  2024

## 2024-12-02 NOTE — PROGRESS NOTES
Pharmacist Discharge Medication Reconciliation    Discharging Provider: Dr. Turner    Significant PMH:   Past Medical History:   Diagnosis Date    ADHD     Anxiety     Bipolar 1 disorder (HCC)     Dental disorder     Depression     anxiety and depression    Headache     History of pseudoseizure      Chief Complaint for this Admission: No chief complaint on file.    Allergies: Bee venom and Methylprednisolone    Discharge Medications:      Medication List        START taking these medications      lurasidone 40 MG Tabs tablet  Commonly known as: LATUDA  Take 1 tablet by mouth Daily with supper            CHANGE how you take these medications      hydrOXYzine HCl 50 MG tablet  Commonly known as: ATARAX  Take 1 tablet by mouth 2 times daily as needed for Anxiety  What changed: reasons to take this            CONTINUE taking these medications      escitalopram 10 MG tablet  Commonly known as: LEXAPRO  Take 1 tablet by mouth daily            STOP taking these medications      nicotine 21 MG/24HR  Commonly known as: NICODERM CQ     prazosin 2 MG capsule  Commonly known as: MINIPRESS     traZODone 50 MG tablet  Commonly known as: DESYREL               Where to Get Your Medications        These medications were sent to [facility] [place] [place] - [address] [place] [address] - P [phone] - F [phone]  [address] [place] [id number]      Phone: [phone]   escitalopram 10 MG tablet  hydrOXYzine HCl 50 MG tablet  lurasidone 40 MG Tabs tablet       The patient's chart, MAR and AVS were reviewed by Veena Bangura RPH.

## 2024-12-05 LAB
BACTERIA SPEC CULT: NORMAL
SERVICE CMNT-IMP: NORMAL

## 2024-12-06 NOTE — H&P
History and Physical    Date of Service:  11/30/2024  Primary Care Provider: Kate Hernandez MD  Source of information: patient, electronic medical record    Chief Complaint: No chief complaint on file.      History of Presenting Illness:   Moises Zurita Jr. is a 24 y.o. male with pmhx of illicit drug use, risky sexual behaviors and tobacco use who presents with bizarre and incongruent behavior.  Patient admitted to behavioral health from ED at [facility].  Patient attests to recent homelessness, illicit drug use, risky sexual behavior and lots of walking on barefeet.  Attests to having productive cough one month, intermittent fever, pain in feet and back of knees with abrasions and blisters.  Hospitalist consulted for medical evaluation       REVIEW OF SYSTEMS:  A comprehensive review of systems was negative except for that written in the History of Present Illness.     Past Medical History:   Diagnosis Date    ADHD     Anxiety     Bipolar 1 disorder (HCC)     Dental disorder     Depression     anxiety and depression    Headache     History of pseudoseizure       Past Surgical History:   Procedure Laterality Date    HEENT      ORTHOPEDIC SURGERY      pellett removal from foot    TONSILLECTOMY  2017     Prior to Admission medications    Medication Sig Start Date End Date Taking? Authorizing Provider   hydrOXYzine HCl (ATARAX) 50 MG tablet Take 1 tablet by mouth 2 times daily as needed for Itching    Provider, MD Lanre   escitalopram (LEXAPRO) 10 MG tablet Take 1 tablet by mouth daily 8/17/24 9/16/24  Juan Turner MD   traZODone (DESYREL) 50 MG tablet Take 1 tablet by mouth nightly as needed for Sleep 8/16/24 9/15/24  Juan Turner MD   prazosin (MINIPRESS) 2 MG capsule Take 1 capsule by mouth nightly 8/16/24 9/15/24  Juan Turner MD   nicotine (NICODERM CQ) 21 MG/24HR Place 1 patch onto the skin daily for 28 days 8/17/24 9/14/24  Juan Turner MD     Allergies   Allergen Reactions    Bee 
Oriented to time, place, and person.  Intelligence is average.  Memory is intact.  Fund of knowledge is adequate.  Insight is poor.  Judgment is poor.    ASSESSMENT AND PLAN:  Diagnosis: Schizoaffective disorder, bipolar type, rule out substance-induced psychosis; alcohol, marijuana, and stimulant use disorder, mild.    At the present time, I will continue with inpatient stay.  We will monitor him for development of withdrawal symptoms.  He will be started back on his psychotropic medications including Latuda, which he has taken in the past with good effect and Lexapro.  He will be provided with support and attend groups.  His strengths include his ability to seek help and support from his grandfather.        DEON KNIGHT MD      ZMA/AQS  D:  11/30/2024 15:52:07  T:  11/30/2024 18:06:19  JOB #:  [id number]

## 2025-03-05 ENCOUNTER — HOSPITAL ENCOUNTER (EMERGENCY)
Facility: HOSPITAL | Age: 25
Discharge: PSYCHIATRIC OR SUBSTANCE USE FACILITY | End: 2025-03-05
Attending: EMERGENCY MEDICINE
Payer: MEDICAID

## 2025-03-05 VITALS
HEART RATE: 61 BPM | WEIGHT: 158.07 LBS | TEMPERATURE: 97.7 F | OXYGEN SATURATION: 98 % | RESPIRATION RATE: 16 BRPM | DIASTOLIC BLOOD PRESSURE: 72 MMHG | SYSTOLIC BLOOD PRESSURE: 119 MMHG | BODY MASS INDEX: 23.34 KG/M2

## 2025-03-05 DIAGNOSIS — F19.10 SUBSTANCE ABUSE (HCC): ICD-10-CM

## 2025-03-05 DIAGNOSIS — R45.851 SUICIDAL IDEATION: Primary | ICD-10-CM

## 2025-03-05 DIAGNOSIS — E86.0 DEHYDRATION: ICD-10-CM

## 2025-03-05 LAB
ALBUMIN SERPL-MCNC: 4 G/DL (ref 3.5–5)
ALBUMIN SERPL-MCNC: 4.7 G/DL (ref 3.5–5)
ALBUMIN/GLOB SERPL: 1.3 (ref 1.1–2.2)
ALBUMIN/GLOB SERPL: 1.3 (ref 1.1–2.2)
ALP SERPL-CCNC: 51 U/L (ref 45–117)
ALP SERPL-CCNC: 59 U/L (ref 45–117)
ALT SERPL-CCNC: 18 U/L (ref 12–78)
ALT SERPL-CCNC: 22 U/L (ref 12–78)
AMPHET UR QL SCN: POSITIVE
ANION GAP SERPL CALC-SCNC: 15 MMOL/L (ref 2–12)
ANION GAP SERPL CALC-SCNC: 20 MMOL/L (ref 2–12)
APAP SERPL-MCNC: <2 UG/ML (ref 10–30)
AST SERPL-CCNC: 23 U/L (ref 15–37)
AST SERPL-CCNC: 34 U/L (ref 15–37)
BARBITURATES UR QL SCN: NEGATIVE
BASOPHILS # BLD: 0.06 K/UL (ref 0–0.1)
BASOPHILS NFR BLD: 0.6 % (ref 0–1)
BENZODIAZ UR QL: NEGATIVE
BILIRUB SERPL-MCNC: 1.1 MG/DL (ref 0.2–1)
BILIRUB SERPL-MCNC: 1.2 MG/DL (ref 0.2–1)
BUN SERPL-MCNC: 17 MG/DL (ref 6–20)
BUN SERPL-MCNC: 20 MG/DL (ref 6–20)
BUN/CREAT SERPL: 17 (ref 12–20)
BUN/CREAT SERPL: 17 (ref 12–20)
CALCIUM SERPL-MCNC: 8.3 MG/DL (ref 8.5–10.1)
CALCIUM SERPL-MCNC: 9.3 MG/DL (ref 8.5–10.1)
CANNABINOIDS UR QL SCN: POSITIVE
CHLORIDE SERPL-SCNC: 94 MMOL/L (ref 97–108)
CHLORIDE SERPL-SCNC: 98 MMOL/L (ref 97–108)
CO2 SERPL-SCNC: 19 MMOL/L (ref 21–32)
CO2 SERPL-SCNC: 20 MMOL/L (ref 21–32)
COCAINE UR QL SCN: NEGATIVE
CREAT SERPL-MCNC: 1 MG/DL (ref 0.7–1.3)
CREAT SERPL-MCNC: 1.16 MG/DL (ref 0.7–1.3)
DIFFERENTIAL METHOD BLD: ABNORMAL
EKG ATRIAL RATE: 106 BPM
EKG DIAGNOSIS: NORMAL
EKG P AXIS: 76 DEGREES
EKG P-R INTERVAL: 130 MS
EKG Q-T INTERVAL: 354 MS
EKG QRS DURATION: 78 MS
EKG QTC CALCULATION (BAZETT): 470 MS
EKG R AXIS: 18 DEGREES
EKG T AXIS: 50 DEGREES
EKG VENTRICULAR RATE: 106 BPM
EOSINOPHIL # BLD: 0.08 K/UL (ref 0–0.4)
EOSINOPHIL NFR BLD: 0.9 % (ref 0–7)
ERYTHROCYTE [DISTWIDTH] IN BLOOD BY AUTOMATED COUNT: 12.2 % (ref 11.5–14.5)
ETHANOL SERPL-MCNC: <10 MG/DL (ref 0–0.08)
GLOBULIN SER CALC-MCNC: 3.2 G/DL (ref 2–4)
GLOBULIN SER CALC-MCNC: 3.6 G/DL (ref 2–4)
GLUCOSE SERPL-MCNC: 68 MG/DL (ref 65–100)
GLUCOSE SERPL-MCNC: 73 MG/DL (ref 65–100)
HCT VFR BLD AUTO: 41.8 % (ref 36.6–50.3)
HGB BLD-MCNC: 15 G/DL (ref 12.1–17)
IMM GRANULOCYTES # BLD AUTO: 0.03 K/UL (ref 0–0.04)
IMM GRANULOCYTES NFR BLD AUTO: 0.3 % (ref 0–0.5)
LYMPHOCYTES # BLD: 3.35 K/UL (ref 0.8–3.5)
LYMPHOCYTES NFR BLD: 35.9 % (ref 12–49)
Lab: ABNORMAL
MCH RBC QN AUTO: 30.7 PG (ref 26–34)
MCHC RBC AUTO-ENTMCNC: 35.9 G/DL (ref 30–36.5)
MCV RBC AUTO: 85.7 FL (ref 80–99)
METHADONE UR QL: NEGATIVE
MONOCYTES # BLD: 1.2 K/UL (ref 0–1)
MONOCYTES NFR BLD: 12.9 % (ref 5–13)
NEUTS SEG # BLD: 4.61 K/UL (ref 1.8–8)
NEUTS SEG NFR BLD: 49.4 % (ref 32–75)
NRBC # BLD: 0 K/UL (ref 0–0.01)
NRBC BLD-RTO: 0 PER 100 WBC
OPIATES UR QL: NEGATIVE
PCP UR QL: NEGATIVE
PLATELET # BLD AUTO: 169 K/UL (ref 150–400)
POTASSIUM SERPL-SCNC: 3.8 MMOL/L (ref 3.5–5.1)
POTASSIUM SERPL-SCNC: 3.9 MMOL/L (ref 3.5–5.1)
PROT SERPL-MCNC: 7.2 G/DL (ref 6.4–8.2)
PROT SERPL-MCNC: 8.3 G/DL (ref 6.4–8.2)
RBC # BLD AUTO: 4.88 M/UL (ref 4.1–5.7)
SALICYLATES SERPL-MCNC: 4 MG/DL (ref 2.8–20)
SODIUM SERPL-SCNC: 133 MMOL/L (ref 136–145)
SODIUM SERPL-SCNC: 133 MMOL/L (ref 136–145)
WBC # BLD AUTO: 9.3 K/UL (ref 4.1–11.1)

## 2025-03-05 PROCEDURE — 80307 DRUG TEST PRSMV CHEM ANLYZR: CPT

## 2025-03-05 PROCEDURE — 93010 ELECTROCARDIOGRAM REPORT: CPT | Performed by: INTERNAL MEDICINE

## 2025-03-05 PROCEDURE — 96374 THER/PROPH/DIAG INJ IV PUSH: CPT

## 2025-03-05 PROCEDURE — 82077 ASSAY SPEC XCP UR&BREATH IA: CPT

## 2025-03-05 PROCEDURE — 6360000002 HC RX W HCPCS: Performed by: EMERGENCY MEDICINE

## 2025-03-05 PROCEDURE — 96361 HYDRATE IV INFUSION ADD-ON: CPT

## 2025-03-05 PROCEDURE — 80179 DRUG ASSAY SALICYLATE: CPT

## 2025-03-05 PROCEDURE — 80143 DRUG ASSAY ACETAMINOPHEN: CPT

## 2025-03-05 PROCEDURE — 99285 EMERGENCY DEPT VISIT HI MDM: CPT

## 2025-03-05 PROCEDURE — 36415 COLL VENOUS BLD VENIPUNCTURE: CPT

## 2025-03-05 PROCEDURE — 85025 COMPLETE CBC W/AUTO DIFF WBC: CPT

## 2025-03-05 PROCEDURE — 93005 ELECTROCARDIOGRAM TRACING: CPT | Performed by: EMERGENCY MEDICINE

## 2025-03-05 PROCEDURE — 2580000003 HC RX 258: Performed by: EMERGENCY MEDICINE

## 2025-03-05 PROCEDURE — 80053 COMPREHEN METABOLIC PANEL: CPT

## 2025-03-05 RX ORDER — 0.9 % SODIUM CHLORIDE 0.9 %
1000 INTRAVENOUS SOLUTION INTRAVENOUS ONCE
Status: COMPLETED | OUTPATIENT
Start: 2025-03-05 | End: 2025-03-05

## 2025-03-05 RX ORDER — DIPHENHYDRAMINE HYDROCHLORIDE 50 MG/ML
25 INJECTION INTRAMUSCULAR; INTRAVENOUS
Status: COMPLETED | OUTPATIENT
Start: 2025-03-05 | End: 2025-03-05

## 2025-03-05 RX ADMIN — SODIUM CHLORIDE 1000 ML: 0.9 INJECTION, SOLUTION INTRAVENOUS at 12:31

## 2025-03-05 RX ADMIN — DIPHENHYDRAMINE HYDROCHLORIDE 25 MG: 50 INJECTION INTRAMUSCULAR; INTRAVENOUS at 13:59

## 2025-03-05 ASSESSMENT — ENCOUNTER SYMPTOMS
COUGH: 0
ABDOMINAL PAIN: 0
VOMITING: 0
SHORTNESS OF BREATH: 0

## 2025-03-05 NOTE — ED NOTES
Labs, urine and EKG obtained. PIV in place. Pt placed in green gown and yellow socks. Pt belongings secured. Jesus williamson x 2 at bedside with patient. Pt began in 1 (LUE) handcuffs. Pt now in bilateral upper extremity hand cuffs due to pt attempting to pull out IV and pull off clothing.     Pt remains in a visibly anxious state. Pt talking to himself and asking this RN and police questions.       Pt provided with water per pt request.

## 2025-03-05 NOTE — ED PROVIDER NOTES
Conjunctiva/sclera: Conjunctivae normal.      Pupils: Pupils are equal, round, and reactive to light.   Cardiovascular:      Rate and Rhythm: Normal rate and regular rhythm.      Pulses: Normal pulses.   Pulmonary:      Effort: Pulmonary effort is normal. No respiratory distress.      Breath sounds: Normal breath sounds. No wheezing or rhonchi.   Abdominal:      General: Bowel sounds are normal.      Palpations: Abdomen is soft.      Tenderness: There is no abdominal tenderness.   Musculoskeletal:         General: Normal range of motion.      Cervical back: Normal range of motion.   Skin:     General: Skin is warm.      Capillary Refill: Capillary refill takes less than 2 seconds.   Neurological:      General: No focal deficit present.      Mental Status: He is alert and oriented to person, place, and time.   Psychiatric:         Mood and Affect: Mood normal.         Speech: Speech is rapid and pressured.         Behavior: Behavior is agitated.         Thought Content: Thought content includes suicidal ideation. Thought content does not include homicidal ideation.         DIAGNOSTIC RESULTS       No orders to display       Labs Reviewed   CBC WITH AUTO DIFFERENTIAL - Abnormal; Notable for the following components:       Result Value    Monocytes Absolute 1.20 (*)     All other components within normal limits   COMPREHENSIVE METABOLIC PANEL - Abnormal; Notable for the following components:    Sodium 133 (*)     Chloride 94 (*)     CO2 19 (*)     Anion Gap 20 (*)     Total Bilirubin 1.2 (*)     Total Protein 8.3 (*)     All other components within normal limits   ACETAMINOPHEN LEVEL - Abnormal; Notable for the following components:    Acetaminophen Level <2 (*)     All other components within normal limits   URINE DRUG SCREEN - Abnormal; Notable for the following components:    Amphetamine, Urine Positive (*)     THC, TH-Cannabinol, Urine Positive (*)     All other components within normal limits   COMPREHENSIVE

## 2025-03-05 NOTE — ED NOTES
Pt speaking via telephone to sister with [facility].     Pt continues to express hopelessness and anxiety but follows directions.

## 2025-03-05 NOTE — ED NOTES
ED SIGN OUT NOTE  Care assumed at [facility] 4:08 PM EST    Patient was signed out to me by Dr. Marcos.     Patient is awaiting TDO and bed placement.    BP (!) 169/74   Pulse 85   Temp 98.4 °F (36.9 °C) (Oral)   Resp 24   SpO2 96%     Labs Reviewed   CBC WITH AUTO DIFFERENTIAL - Abnormal; Notable for the following components:       Result Value    Monocytes Absolute 1.20 (*)     All other components within normal limits   COMPREHENSIVE METABOLIC PANEL - Abnormal; Notable for the following components:    Sodium 133 (*)     Chloride 94 (*)     CO2 19 (*)     Anion Gap 20 (*)     Total Bilirubin 1.2 (*)     Total Protein 8.3 (*)     All other components within normal limits   ACETAMINOPHEN LEVEL - Abnormal; Notable for the following components:    Acetaminophen Level <2 (*)     All other components within normal limits   URINE DRUG SCREEN - Abnormal; Notable for the following components:    Amphetamine, Urine Positive (*)     THC, TH-Cannabinol, Urine Positive (*)     All other components within normal limits   COMPREHENSIVE METABOLIC PANEL - Abnormal; Notable for the following components:    Sodium 133 (*)     CO2 20 (*)     Anion Gap 15 (*)     Calcium 8.3 (*)     Total Bilirubin 1.1 (*)     All other components within normal limits   SALICYLATE LEVEL   ETHANOL     No orders to display       ED Course as of 03/05/25 1610   Wed Mar 05, 2025   1235 ED EKG Interpretation:  Time: 1226  Rhythm: sinus tachycardia; and regular . Rate (approx.): 106; Axis: normal; P wave: normal; QRS interval: normal ; ST/T wave: normal; Other findings: no ischemia  EKG documented by Flores Catherine MD and interpreted by Florin.   [LA]   1334 Anion Gap(!): 20 [LA]   1334 CARBON DIOXIDE(!): 19  Ivf infusing. [LA]   1523 Anion Gap(!): 15  Improved after ivf hydration. HR down to 85. Tolerating po.  [LA]   1545 Medically cleared for psychiatric bed placement.  [LA]      ED Course User Index  [LA] Flores Catherine MD  Mirvaso Counseling: Mirvaso is a topical medication which can decrease superficial blood flow where applied. Side effects are uncommon and include stinging, redness and allergic reactions.

## 2025-03-05 NOTE — ED NOTES
Jesus williamson contacted pt's mother via telephone and allowed pt to speak to mother per patient's request.

## 2025-03-05 NOTE — ED TRIAGE NOTES
Patient arrives with [facility] under paperless ECO for a second time. Patient initially under paperless ECO yesterday after mom obtained one d/t suicidal thoughts/patient displaying manic behavior, but upon arrival at [facility]'s office yesterday, patient took off running and was unable to be located - ECO . Patient was found today looking into buildings (again) and the police were called. Patient arrives disheveled, anxious, with a flight of ideas and pressured speech. Patient states that he lies a lot but also tells the truth a lot. He reports taking drugs - \"a little bit of everything\" and states, \"I need my fix\".

## 2025-03-05 NOTE — ED NOTES
Pt provided with 2nd cup of water. Pt requesting to see family. Pt educated reasons for no visitors. No family currently at ED.

## 2025-03-06 NOTE — ED NOTES
Pt left with police under TDO. Emtala and paperwork given to police. Pt ambulatory with steady gait, calm, cooperative, Clear speech. AXOX4

## 2025-03-06 NOTE — ED NOTES
TRANSFER - OUT REPORT:    Verbal report given to Lorne CARRASCO on Moises Zurita Jr.  being transferred to [facility] for routine progression of patient care       Report consisted of patient's Situation, Background, Assessment and   Recommendations(SBAR).     Information from the following report(s) ED SBAR was reviewed with the receiving nurse.    [place] Fall Assessment:    Presents to emergency department  because of falls (Syncope, seizure, or loss of consciousness): No  Age > 70: No  Altered Mental Status, Intoxication with alcohol or substance confusion (Disorientation, impaired judgment, poor safety awaremess, or inability to follow instructions): No  Impaired Mobility: Ambulates or transfers with assistive devices or assistance; Unable to ambulate or transer.: No  Nursing Judgement: No          Lines:   Peripheral IV 03/05/25 Right Antecubital (Active)        Opportunity for questions and clarification was provided.

## 2025-03-27 ENCOUNTER — HOSPITAL ENCOUNTER (EMERGENCY)
Facility: HOSPITAL | Age: 25
Discharge: HOME | End: 2025-03-27
Attending: EMERGENCY MEDICINE
Payer: MEDICAID

## 2025-03-27 VITALS
TEMPERATURE: 98.4 F | SYSTOLIC BLOOD PRESSURE: 123 MMHG | HEART RATE: 86 BPM | DIASTOLIC BLOOD PRESSURE: 63 MMHG | RESPIRATION RATE: 14 BRPM | OXYGEN SATURATION: 96 %

## 2025-03-27 DIAGNOSIS — F41.0 PANIC ANXIETY SYNDROME: ICD-10-CM

## 2025-03-27 DIAGNOSIS — T78.40XA ALLERGIC REACTION, INITIAL ENCOUNTER: Primary | ICD-10-CM

## 2025-03-27 PROCEDURE — 2580000003 HC RX 258: Performed by: EMERGENCY MEDICINE

## 2025-03-27 PROCEDURE — 96375 TX/PRO/DX INJ NEW DRUG ADDON: CPT

## 2025-03-27 PROCEDURE — 99284 EMERGENCY DEPT VISIT MOD MDM: CPT

## 2025-03-27 PROCEDURE — 6360000002 HC RX W HCPCS: Performed by: EMERGENCY MEDICINE

## 2025-03-27 PROCEDURE — 96361 HYDRATE IV INFUSION ADD-ON: CPT

## 2025-03-27 PROCEDURE — 96374 THER/PROPH/DIAG INJ IV PUSH: CPT

## 2025-03-27 PROCEDURE — 2500000003 HC RX 250 WO HCPCS: Performed by: EMERGENCY MEDICINE

## 2025-03-27 RX ORDER — DEXAMETHASONE SODIUM PHOSPHATE 10 MG/ML
10 INJECTION, SOLUTION INTRAMUSCULAR; INTRAVENOUS ONCE
Status: COMPLETED | OUTPATIENT
Start: 2025-03-27 | End: 2025-03-27

## 2025-03-27 RX ORDER — PREDNISONE 50 MG/1
50 TABLET ORAL DAILY
Qty: 5 TABLET | Refills: 0 | Status: SHIPPED | OUTPATIENT
Start: 2025-03-27 | End: 2025-04-01

## 2025-03-27 RX ORDER — DIPHENHYDRAMINE HYDROCHLORIDE 50 MG/ML
50 INJECTION, SOLUTION INTRAMUSCULAR; INTRAVENOUS
Status: COMPLETED | OUTPATIENT
Start: 2025-03-27 | End: 2025-03-27

## 2025-03-27 RX ORDER — 0.9 % SODIUM CHLORIDE 0.9 %
1000 INTRAVENOUS SOLUTION INTRAVENOUS ONCE
Status: COMPLETED | OUTPATIENT
Start: 2025-03-27 | End: 2025-03-27

## 2025-03-27 RX ORDER — DIPHENHYDRAMINE HCL 25 MG
50 CAPSULE ORAL EVERY 6 HOURS PRN
Qty: 30 CAPSULE | Refills: 0 | Status: SHIPPED | OUTPATIENT
Start: 2025-03-27

## 2025-03-27 RX ORDER — FAMOTIDINE 20 MG/1
20 TABLET, FILM COATED ORAL 2 TIMES DAILY
Qty: 20 TABLET | Refills: 0 | Status: SHIPPED | OUTPATIENT
Start: 2025-03-27 | End: 2025-04-06

## 2025-03-27 RX ADMIN — FAMOTIDINE 20 MG: 10 INJECTION, SOLUTION INTRAVENOUS at 01:01

## 2025-03-27 RX ADMIN — SODIUM CHLORIDE 1000 ML: 0.9 INJECTION, SOLUTION INTRAVENOUS at 01:00

## 2025-03-27 RX ADMIN — DEXAMETHASONE SODIUM PHOSPHATE 10 MG: 10 INJECTION, SOLUTION INTRAMUSCULAR; INTRAVENOUS at 01:02

## 2025-03-27 RX ADMIN — DIPHENHYDRAMINE HYDROCHLORIDE 50 MG: 50 INJECTION INTRAMUSCULAR; INTRAVENOUS at 01:01

## 2025-03-27 ASSESSMENT — PAIN - FUNCTIONAL ASSESSMENT
PAIN_FUNCTIONAL_ASSESSMENT: 0-10

## 2025-03-27 ASSESSMENT — PAIN SCALES - GENERAL
PAINLEVEL_OUTOF10: 0

## 2025-03-27 NOTE — ED TRIAGE NOTES
Pt ambulates to treatment area without any gait disturbances.  Pt states that 5 hours ago he started having a sore throat and an hour ago he started \"having a hard time swallowing\".  Pt states that he is normally allergic to bee stings and steroids but does not know what he is having a reaction too.  Pt also states that he had a covid and flu shot earlier today and \"I know its not a reaction to them because I never have a reaction to them\"

## 2025-03-27 NOTE — ED PROVIDER NOTES
[facility] DEPART[facility]  EMERGENCY DEPARTMENT ENCOUNTER      Pt Name: Moises Zurita Jr.  MRN: [id number]  Birthdate 2000  Date of evaluation: 3/27/2025  Provider: Pedro Suarez MD    CHIEF COMPLAINT       Chief Complaint   Patient presents with    Allergic Reaction                HISTORY OF PRESENT ILLNESS   (Location/Symptom, Timing/Onset, Context/Setting, Quality, Duration, Modifying Factors, Severity)  Note limiting factors.   25-year-old male with a past medical history significant for ADHD, anxiety, bipolar disorder, headache, pseudoseizure presents to the ER with a complaint of general malaise with throat swelling and itching and difficulty breathing that began approximately 2 hours ago after the patient drank beer and had pain taking his medication as prescribed.  The patient is concerned that he is having allergic reaction because he has had similar episode in the past that felt the same way.  He admits to redness and chills with generalized itching and throat swelling.  He denies any fever, headache, nausea or vomiting, abdominal pain, dizziness, extremity weakness or numbness, new medication, drugs consumption or any other remedy at this time.  He appears very anxious and restless but otherwise stable.            Review of External Medical Records:     Nursing Notes were reviewed.    REVIEW OF SYSTEMS    (2-9 systems for level 4, 10 or more for level 5)     Review of Systems   All other systems reviewed and are negative.      Except as noted above the remainder of the review of systems was reviewed and negative.       PAST MEDICAL HISTORY     Past Medical History:   Diagnosis Date    ADHD     Anxiety     Bipolar 1 disorder (HCC)     Dental disorder     Depression     anxiety and depression    Headache     History of pseudoseizure          SURGICAL HISTORY       Past Surgical History:   Procedure Laterality Date    HEENT      ORTHOPEDIC SURGERY      pellett removal from foot

## 2025-04-23 ENCOUNTER — HOSPITAL ENCOUNTER (EMERGENCY)
Facility: HOSPITAL | Age: 25
Discharge: HOME | End: 2025-04-23
Attending: EMERGENCY MEDICINE
Payer: MEDICAID

## 2025-04-23 VITALS
TEMPERATURE: 98 F | WEIGHT: 175 LBS | DIASTOLIC BLOOD PRESSURE: 69 MMHG | HEIGHT: 69 IN | SYSTOLIC BLOOD PRESSURE: 135 MMHG | RESPIRATION RATE: 10 BRPM | HEART RATE: 61 BPM | BODY MASS INDEX: 25.92 KG/M2 | OXYGEN SATURATION: 98 %

## 2025-04-23 DIAGNOSIS — K92.0 HEMATEMESIS WITH NAUSEA: Primary | ICD-10-CM

## 2025-04-23 DIAGNOSIS — R10.13 EPIGASTRIC PAIN: ICD-10-CM

## 2025-04-23 DIAGNOSIS — R07.9 CHEST PAIN, UNSPECIFIED TYPE: ICD-10-CM

## 2025-04-23 DIAGNOSIS — E86.0 DEHYDRATION: ICD-10-CM

## 2025-04-23 LAB
ALBUMIN SERPL-MCNC: 4.2 G/DL (ref 3.5–5)
ALBUMIN/GLOB SERPL: 1.2 (ref 1.1–2.2)
ALP SERPL-CCNC: 53 U/L (ref 45–117)
ALT SERPL-CCNC: 31 U/L (ref 12–78)
ANION GAP SERPL CALC-SCNC: 9 MMOL/L (ref 2–12)
AST SERPL-CCNC: 22 U/L (ref 15–37)
BASOPHILS # BLD: 0.05 K/UL (ref 0–0.1)
BASOPHILS NFR BLD: 0.9 % (ref 0–1)
BILIRUB SERPL-MCNC: 0.4 MG/DL (ref 0.2–1)
BUN SERPL-MCNC: 10 MG/DL (ref 6–20)
BUN/CREAT SERPL: 11 (ref 12–20)
CALCIUM SERPL-MCNC: 9.2 MG/DL (ref 8.5–10.1)
CHLORIDE SERPL-SCNC: 103 MMOL/L (ref 97–108)
CO2 SERPL-SCNC: 28 MMOL/L (ref 21–32)
CREAT SERPL-MCNC: 0.92 MG/DL (ref 0.7–1.3)
DIFFERENTIAL METHOD BLD: ABNORMAL
EOSINOPHIL # BLD: 0.06 K/UL (ref 0–0.4)
EOSINOPHIL NFR BLD: 1 % (ref 0–7)
ERYTHROCYTE [DISTWIDTH] IN BLOOD BY AUTOMATED COUNT: 12.2 % (ref 11.5–14.5)
GLOBULIN SER CALC-MCNC: 3.5 G/DL (ref 2–4)
GLUCOSE SERPL-MCNC: 89 MG/DL (ref 65–100)
HCT VFR BLD AUTO: 41 % (ref 36.6–50.3)
HEMOCCULT STL QL: NEGATIVE
HGB BLD-MCNC: 14.2 G/DL (ref 12.1–17)
IMM GRANULOCYTES # BLD AUTO: 0.02 K/UL (ref 0–0.04)
IMM GRANULOCYTES NFR BLD AUTO: 0.3 % (ref 0–0.5)
INR PPP: 1.1 (ref 0.9–1.1)
LIPASE SERPL-CCNC: 19 U/L (ref 13–75)
LYMPHOCYTES # BLD: 2.3 K/UL (ref 0.8–3.5)
LYMPHOCYTES NFR BLD: 39.9 % (ref 12–49)
MCH RBC QN AUTO: 29.6 PG (ref 26–34)
MCHC RBC AUTO-ENTMCNC: 34.6 G/DL (ref 30–36.5)
MCV RBC AUTO: 85.4 FL (ref 80–99)
MONOCYTES # BLD: 0.6 K/UL (ref 0–1)
MONOCYTES NFR BLD: 10.4 % (ref 5–13)
NEUTS SEG # BLD: 2.74 K/UL (ref 1.8–8)
NEUTS SEG NFR BLD: 47.5 % (ref 32–75)
NRBC # BLD: 0 K/UL (ref 0–0.01)
NRBC BLD-RTO: 0 PER 100 WBC
PLATELET # BLD AUTO: 147 K/UL (ref 150–400)
PMV BLD AUTO: 12.4 FL (ref 8.9–12.9)
POTASSIUM SERPL-SCNC: 3.8 MMOL/L (ref 3.5–5.1)
PROT SERPL-MCNC: 7.7 G/DL (ref 6.4–8.2)
PROTHROMBIN TIME: 10.8 SEC (ref 9.2–11.2)
RBC # BLD AUTO: 4.8 M/UL (ref 4.1–5.7)
SODIUM SERPL-SCNC: 140 MMOL/L (ref 136–145)
WBC # BLD AUTO: 5.8 K/UL (ref 4.1–11.1)

## 2025-04-23 PROCEDURE — 99284 EMERGENCY DEPT VISIT MOD MDM: CPT

## 2025-04-23 PROCEDURE — 85025 COMPLETE CBC W/AUTO DIFF WBC: CPT

## 2025-04-23 PROCEDURE — 96374 THER/PROPH/DIAG INJ IV PUSH: CPT

## 2025-04-23 PROCEDURE — 93005 ELECTROCARDIOGRAM TRACING: CPT | Performed by: EMERGENCY MEDICINE

## 2025-04-23 PROCEDURE — 6360000002 HC RX W HCPCS: Performed by: EMERGENCY MEDICINE

## 2025-04-23 PROCEDURE — 83690 ASSAY OF LIPASE: CPT

## 2025-04-23 PROCEDURE — 80053 COMPREHEN METABOLIC PANEL: CPT

## 2025-04-23 PROCEDURE — 85610 PROTHROMBIN TIME: CPT

## 2025-04-23 PROCEDURE — 96361 HYDRATE IV INFUSION ADD-ON: CPT

## 2025-04-23 PROCEDURE — 36415 COLL VENOUS BLD VENIPUNCTURE: CPT

## 2025-04-23 PROCEDURE — 96375 TX/PRO/DX INJ NEW DRUG ADDON: CPT

## 2025-04-23 PROCEDURE — 82272 OCCULT BLD FECES 1-3 TESTS: CPT

## 2025-04-23 PROCEDURE — 2580000003 HC RX 258: Performed by: EMERGENCY MEDICINE

## 2025-04-23 RX ORDER — PANTOPRAZOLE SODIUM 20 MG/1
20 TABLET, DELAYED RELEASE ORAL
Qty: 30 TABLET | Refills: 0 | Status: SHIPPED | OUTPATIENT
Start: 2025-04-23

## 2025-04-23 RX ORDER — 0.9 % SODIUM CHLORIDE 0.9 %
1000 INTRAVENOUS SOLUTION INTRAVENOUS ONCE
Status: COMPLETED | OUTPATIENT
Start: 2025-04-23 | End: 2025-04-23

## 2025-04-23 RX ORDER — ONDANSETRON 2 MG/ML
4 INJECTION INTRAMUSCULAR; INTRAVENOUS ONCE
Status: COMPLETED | OUTPATIENT
Start: 2025-04-23 | End: 2025-04-23

## 2025-04-23 RX ADMIN — SODIUM CHLORIDE 1000 ML: 0.9 INJECTION, SOLUTION INTRAVENOUS at 18:52

## 2025-04-23 RX ADMIN — ONDANSETRON 4 MG: 2 INJECTION, SOLUTION INTRAMUSCULAR; INTRAVENOUS at 18:53

## 2025-04-23 RX ADMIN — SODIUM CHLORIDE 80 MG: 9 INJECTION INTRAMUSCULAR; INTRAVENOUS; SUBCUTANEOUS at 18:55

## 2025-04-23 ASSESSMENT — LIFESTYLE VARIABLES
HOW MANY STANDARD DRINKS CONTAINING ALCOHOL DO YOU HAVE ON A TYPICAL DAY: 3 OR 4
HOW OFTEN DO YOU HAVE A DRINK CONTAINING ALCOHOL: 4 OR MORE TIMES A WEEK

## 2025-04-23 ASSESSMENT — PAIN DESCRIPTION - PAIN TYPE: TYPE: ACUTE PAIN

## 2025-04-23 ASSESSMENT — PAIN - FUNCTIONAL ASSESSMENT
PAIN_FUNCTIONAL_ASSESSMENT: ACTIVITIES ARE NOT PREVENTED
PAIN_FUNCTIONAL_ASSESSMENT: NONE - DENIES PAIN

## 2025-04-23 ASSESSMENT — PAIN DESCRIPTION - LOCATION: LOCATION: HEAD

## 2025-04-23 ASSESSMENT — PAIN DESCRIPTION - DESCRIPTORS: DESCRIPTORS: ACHING

## 2025-04-23 ASSESSMENT — PAIN DESCRIPTION - FREQUENCY: FREQUENCY: CONTINUOUS

## 2025-04-23 ASSESSMENT — PAIN DESCRIPTION - ORIENTATION: ORIENTATION: ANTERIOR

## 2025-04-23 ASSESSMENT — PAIN SCALES - GENERAL: PAINLEVEL_OUTOF10: 3

## 2025-04-23 NOTE — ED TRIAGE NOTES
Pt ambulated to the treatment area accompanied by [facility]. Report of pt vomited x2 this morning since then feels like he is going to pass out. HX of vomiting blood has not done the follow up.Pt states \"I was drinking last night I stopped around 5 am that is not usual for me it was just a little more than usual and also I was vaping 5% nicotine. I intermittently felt like I was going to pass out.\" Pt describes blood in vomit as \"not that much not clots just streaks.\"

## 2025-04-23 NOTE — ED PROVIDER NOTES
following components:    BUN/Creatinine Ratio 11 (*)     All other components within normal limits   PROTIME-INR   OCCULT BLOOD, FECAL   LIPASE       All other labs were within normal range or not returned as of this dictation.    EMERGENCY DEPARTMENT COURSE and DIFFERENTIAL DIAGNOSIS/MDM:   Vitals:    Vitals:    04/23/25 1803 04/23/25 1840 04/23/25 2100   BP: (!) 181/115 (!) 149/79 135/69   Pulse: 80 69 61   Resp: 16 10    Temp: 98 °F (36.7 °C)     TempSrc: Oral     SpO2: 97% 98% 98%   Weight: 79.4 kg (175 lb)     Height: 1.753 m (5' 9\")             Medical Decision Making  Amount and/or Complexity of Data Reviewed  Labs: ordered.  ECG/medicine tests: ordered. Decision-making details documented in ED Course.    Risk  Prescription drug management.      The patient is resting comfortably and feels better, is alert and in no distress. The repeat examination is unremarkable and benign; in particular, there is no discomfort at McBurney's point. The history, exam, diagnostic testing, and current condition do not suggest acute appendicitis, bowel obstruction, incarcerated hernia, acute cholecystitis, bowel perforation, major gastrointestinal bleeding, severe diverticulitis, sepsis, or other significant pathology to warrant further testing, continued ED treatment, admission, or surgical evaluation at this point. The vital signs have been stable and are within normal limits at this time. The patient does not have uncontrollable pain, intractable vomiting, or other significant symptoms. The patient's condition is stable and appropriate for discharge. The patient will pursue further outpatient evaluation with the primary care physician or other designated or consulting physician as indicated in the discharge instructions.         REASSESSMENT     ED Course as of 04/23/25 2239 Wed Apr 23, 2025 1825 EKG 12 Lead (Select if Upper Abdominal Pain or symptoms of SOB,or Tachycardia)  Normal sinus rhythm, 76 bpm, normal axis,

## 2025-04-23 NOTE — ED NOTES
Bedside shift change report given to Julieth RN (oncoming nurse) by Francisco RN (offgoing nurse). Report included the following information ED SBAR.

## 2025-04-24 NOTE — ED NOTES
Pt discharged home ambulatory,alert and oriented.Discharge instructions reviewed and no concerns raised at this time.

## 2025-04-26 LAB
EKG ATRIAL RATE: 76 BPM
EKG DIAGNOSIS: NORMAL
EKG P AXIS: 70 DEGREES
EKG P-R INTERVAL: 142 MS
EKG Q-T INTERVAL: 376 MS
EKG QRS DURATION: 86 MS
EKG QTC CALCULATION (BAZETT): 423 MS
EKG R AXIS: 18 DEGREES
EKG T AXIS: 47 DEGREES
EKG VENTRICULAR RATE: 76 BPM

## 2025-04-26 PROCEDURE — 93010 ELECTROCARDIOGRAM REPORT: CPT | Performed by: SPECIALIST

## (undated) DEVICE — INFECTION CONTROL KIT SYS

## (undated) DEVICE — 1200 GUARD II KIT W/5MM TUBE W/O VAC TUBE: Brand: GUARDIAN

## (undated) DEVICE — BULB SYRINGE, IRRIGATION WITH PROTECTIVE CAP, 60 CC, INDIVIDUALLY WRAPPED: Brand: DOVER

## (undated) DEVICE — SUTURE CHROMIC GUT SZ 3-0 L27IN ABSRB BRN L19MM FS-2 3/8 636H

## (undated) DEVICE — DRAPE,REIN 53X77,STERILE: Brand: MEDLINE

## (undated) DEVICE — STERILE POLYISOPRENE POWDER-FREE SURGICAL GLOVES WITH EMOLLIENT COATING: Brand: PROTEXIS

## (undated) DEVICE — SKIN MARKER,REGULAR TIP WITH RULER AND LABELS: Brand: DEVON

## (undated) DEVICE — TOWEL SURG W17XL27IN STD BLU COT NONFENESTRATED PREWASHED

## (undated) DEVICE — SUCTION COAGULATOR: Brand: VALLEYLAB

## (undated) DEVICE — SINGLE BASIN: Brand: CARDINAL HEALTH

## (undated) DEVICE — MEDI-VAC NON-CONDUCTIVE SUCTION TUBING: Brand: CARDINAL HEALTH

## (undated) DEVICE — TIP SUCT BLU PLAS BLB W/O CTRL VENT YANK

## (undated) DEVICE — REM POLYHESIVE ADULT PATIENT RETURN ELECTRODE: Brand: VALLEYLAB

## (undated) DEVICE — BASIC PACK: Brand: CONVERTORS

## (undated) DEVICE — X-RAY SPONGES,16 PLY: Brand: DERMACEA

## (undated) DEVICE — GOWN,SIRUS,FABRNF,XL,20/CS: Brand: MEDLINE

## (undated) DEVICE — SOLUTION IV 1000ML 0.9% SOD CHL

## (undated) DEVICE — SPONGE TONSIL MED X RAY DETECTABLE STRL LTX FREE